# Patient Record
Sex: MALE
[De-identification: names, ages, dates, MRNs, and addresses within clinical notes are randomized per-mention and may not be internally consistent; named-entity substitution may affect disease eponyms.]

---
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
## 2021-01-04 ENCOUNTER — TRANSCRIPTION ENCOUNTER (OUTPATIENT)
Age: 21
End: 2021-01-04

## 2021-02-23 ENCOUNTER — APPOINTMENT (OUTPATIENT)
Dept: ENDOCRINOLOGY | Facility: CLINIC | Age: 21
End: 2021-02-23
Payer: COMMERCIAL

## 2021-02-23 PROCEDURE — 99214 OFFICE O/P EST MOD 30 MIN: CPT

## 2021-02-23 PROCEDURE — 99072 ADDL SUPL MATRL&STAF TM PHE: CPT

## 2021-02-23 NOTE — REASON FOR VISIT
[Home] : at home, [unfilled] , at the time of the visit. [Medical Office: ([facility])___] : at the medical office located in  [Verbal consent obtained from patient] : the patient, [unfilled] [Follow - Up] : a follow-up visit [Pituitary Evaluation/ Disorder] : pituitary evaluation/disorder

## 2021-02-23 NOTE — HISTORY OF PRESENT ILLNESS
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 They are on estrogen tx as they are considering being transgender however they are stating that they are still trying to "figure this  out". They are under care of a psychotherapist and in a supportive family environment.\par Gerber is known to have a pituitary microadenoma (3 mm on the MRI of 12/12/2019).\par Endocrine w/u has been negative for a functioning pituitary adenoma so far except for PRL level of 30.3 ng/ml on 8/21/2020(on estrogen tx).\par In addition Gerber reports a previous OGTT during which GH level reportedly johnny paradoxically - Gerber will forward the results to me.\rey Mayes is on tx with injectable and oral estrogen and would like to start also Estragel.\par The latest E2 level was 85.\rey Mayes is also on micronized progesterone and a 5-alpha-reductase inhibitor.\par Gerber c/o occasional headaches and nausea.\par 11/17/2020. This was a telephone appointment; I obtained the patient's consent.\par Gerber is doing well but c/o excessive hair on the face and body.\par OGTT tests failed to reveal evidence for acromegaly.\par 2/23/21. The patient is doing well overall.\par The excessive hair growth (which was his major complaint) is improving with electrolysis.\par He would like to adjust his oral estradiol tx and switch from estradiol valerate to depo estradiol cipionate.

## 2021-02-23 NOTE — ASSESSMENT
[FreeTextEntry1] : The patient's condition is stable.\par Will adjust estradiol tx.\par Lab. tests in one month.\par F/u once the results of the lab. tests are available.

## 2021-02-24 RX ORDER — ESTRADIOL VALERATE 40 MG/ML
40 INJECTION INTRAMUSCULAR
Qty: 5 | Refills: 2 | Status: DISCONTINUED | COMMUNITY
Start: 2020-03-27 | End: 2021-02-24

## 2021-02-24 RX ORDER — ESTRADIOL 0.25 MG/.25G
0.25 GEL TOPICAL
Qty: 1 | Refills: 3 | Status: DISCONTINUED | COMMUNITY
Start: 2020-11-17 | End: 2021-02-24

## 2021-02-24 RX ORDER — ESTRADIOL 0.75 MG/1.25G
0.75 MG/1.25 GM GEL, METERED TOPICAL DAILY
Qty: 1 | Refills: 6 | Status: DISCONTINUED | COMMUNITY
Start: 2020-08-24 | End: 2021-02-24

## 2021-04-08 ENCOUNTER — TRANSCRIPTION ENCOUNTER (OUTPATIENT)
Age: 21
End: 2021-04-08

## 2021-04-08 LAB
ALBUMIN SERPL ELPH-MCNC: 4.5 G/DL
ALP BLD-CCNC: 56 U/L
ALT SERPL-CCNC: 13 U/L
ANION GAP SERPL CALC-SCNC: 12 MMOL/L
AST SERPL-CCNC: 13 U/L
B-OH-BUTYR SERPL-SCNC: 0.2 MMOL/L
BASOPHILS # BLD AUTO: 0.02 K/UL
BASOPHILS NFR BLD AUTO: 0.2 %
BILIRUB SERPL-MCNC: 0.5 MG/DL
BUN SERPL-MCNC: 14 MG/DL
CALCIUM SERPL-MCNC: 9.9 MG/DL
CHLORIDE SERPL-SCNC: 100 MMOL/L
CHOLEST SERPL-MCNC: 160 MG/DL
CO2 SERPL-SCNC: 25 MMOL/L
CORTIS SERPL-MCNC: 17 UG/DL
CREAT SERPL-MCNC: 0.78 MG/DL
CRP SERPL-MCNC: <3 MG/L
EOSINOPHIL # BLD AUTO: 0.12 K/UL
EOSINOPHIL NFR BLD AUTO: 1.4 %
ERYTHROCYTE [SEDIMENTATION RATE] IN BLOOD BY WESTERGREN METHOD: 2 MM/HR
ESTRADIOL SERPL-MCNC: 105 PG/ML
FERRITIN SERPL-MCNC: 84 NG/ML
FSH SERPL-MCNC: <0.3 IU/L
GLUCOSE SERPL-MCNC: 99 MG/DL
HCT VFR BLD CALC: 40.4 %
HDLC SERPL-MCNC: 52 MG/DL
HGB BLD-MCNC: 13.4 G/DL
IGF-1 INTERP: NORMAL
IGF-I BLD-MCNC: 270 NG/ML
IMM GRANULOCYTES NFR BLD AUTO: 0.2 %
IRON SATN MFR SERPL: 33 %
IRON SERPL-MCNC: 90 UG/DL
LDLC SERPL CALC-MCNC: 92 MG/DL
LH SERPL-ACNC: 3.1 IU/L
LYMPHOCYTES # BLD AUTO: 2.06 K/UL
LYMPHOCYTES NFR BLD AUTO: 23.9 %
MAN DIFF?: NORMAL
MCHC RBC-ENTMCNC: 29.8 PG
MCHC RBC-ENTMCNC: 33.2 GM/DL
MCV RBC AUTO: 89.8 FL
MONOCYTES # BLD AUTO: 0.63 K/UL
MONOCYTES NFR BLD AUTO: 7.3 %
NEUTROPHILS # BLD AUTO: 5.77 K/UL
NEUTROPHILS NFR BLD AUTO: 67 %
NONHDLC SERPL-MCNC: 108 MG/DL
PLATELET # BLD AUTO: 259 K/UL
POTASSIUM SERPL-SCNC: 3.7 MMOL/L
PROLACTIN SERPL-MCNC: 21.1 NG/ML
PROT SERPL-MCNC: 6.9 G/DL
RBC # BLD: 4.5 M/UL
RBC # FLD: 13.6 %
SHBG SERPL-SCNC: 40.3 NMOL/L
SODIUM SERPL-SCNC: 138 MMOL/L
TIBC SERPL-MCNC: 271 UG/DL
TRIGL SERPL-MCNC: 80 MG/DL
UIBC SERPL-MCNC: 181 UG/DL
WBC # FLD AUTO: 8.62 K/UL

## 2021-04-12 ENCOUNTER — TRANSCRIPTION ENCOUNTER (OUTPATIENT)
Age: 21
End: 2021-04-12

## 2021-04-12 LAB
ANDROSTANOLONE SERPL-MCNC: 12 NG/DL
TESTOST BND SERPL-MCNC: 14.4 PG/ML
TESTOST SERPL-MCNC: 142.4 NG/DL

## 2021-04-19 ENCOUNTER — APPOINTMENT (OUTPATIENT)
Dept: ENDOCRINOLOGY | Facility: CLINIC | Age: 21
End: 2021-04-19
Payer: COMMERCIAL

## 2021-04-19 PROCEDURE — 99441: CPT

## 2021-04-19 NOTE — ASSESSMENT
[FreeTextEntry1] : TEB visit did not work; \par converted to a telephone appointment.\par Lab results discussed - pt needs an in-person visit with Dr. Lee to be evaluated before any adjustment of medications are made.\par 5 min

## 2021-04-19 NOTE — HISTORY OF PRESENT ILLNESS
[FreeTextEntry1] : Pt of Dr. Lee, called to discuss recent lab results.\par Would like to bring his testosterone to below 60.\par Would like to increase the dose of  estradiol to 15 mg/week.\par C/o "breast feels too flat".\par Otherwise feels fine.

## 2021-04-19 NOTE — REASON FOR VISIT
[Home] : at home, [unfilled] , at the time of the visit. [Medical Office: ([facility])___] : at the medical office located in  [Verbal consent obtained from patient] : the patient, [unfilled] [Transgender Care] : transgender care

## 2021-04-21 ENCOUNTER — APPOINTMENT (OUTPATIENT)
Dept: ENDOCRINOLOGY | Facility: CLINIC | Age: 21
End: 2021-04-21
Payer: COMMERCIAL

## 2021-04-21 VITALS
HEART RATE: 85 BPM | BODY MASS INDEX: 18.73 KG/M2 | DIASTOLIC BLOOD PRESSURE: 73 MMHG | WEIGHT: 130.8 LBS | SYSTOLIC BLOOD PRESSURE: 109 MMHG | HEIGHT: 70 IN

## 2021-04-21 PROCEDURE — 99072 ADDL SUPL MATRL&STAF TM PHE: CPT

## 2021-04-21 PROCEDURE — 99214 OFFICE O/P EST MOD 30 MIN: CPT

## 2021-04-21 NOTE — HISTORY OF PRESENT ILLNESS
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 They are on estrogen tx as they are considering being transgender however they are stating that they are still trying to "figure this  out". They are under care of a psychotherapist and in a supportive family environment.\par Gerber is known to have a pituitary microadenoma (3 mm on the MRI of 12/12/2019).\par Endocrine w/u has been negative for a functioning pituitary adenoma so far except for PRL level of 30.3 ng/ml on 8/21/2020(on estrogen tx).\par In addition Gerber reports a previous OGTT during which GH level reportedly johnny paradoxically - Gerber will forward the results to me.\par Gerber is on tx with injectable and oral estrogen and would like to start also Estragel.\par The latest E2 level was 85.\par Gerber is also on micronized progesterone and a 5-alpha-reductase inhibitor.\par Gerber c/o occasional headaches and nausea.\par 11/17/2020. This was a telephone appointment; I obtained the patient's consent.\par Gerber is doing well but c/o excessive hair on the face and body.\par OGTT tests failed to reveal evidence for acromegaly.\par 2/23/21. The patient is doing well overall.\par The excessive hair growth (which was his major complaint) is improving with electrolysis.\par He would like to adjust his oral estradiol tx and switch from estradiol valerate to depo estradiol cipionate.\par 4/21/21. Gerber is doing well self-injecting estradiol cipionate.\par E2 level was 105 on 4/5/21.\par PRL level was 21. \par Last pituitary MRI was on 12/12/19 - c/w pituitary microadenoma.\par Gerber is interested in doing  semen analysis.

## 2021-04-21 NOTE — REASON FOR VISIT
[Follow - Up] : a follow-up visit [Pituitary Evaluation/ Disorder] : pituitary evaluation/disorder [Transgender Care] : transgender care

## 2021-04-21 NOTE — ASSESSMENT
[FreeTextEntry1] : Male-to-female transgender individual.\par Pituitary microadenoma.\par \par Will increase the dose of estrogen.\par Will repeat pituitary MRI.\par Referral to reproductive urologist.\par F/u - 2-3 months.

## 2021-05-12 ENCOUNTER — TRANSCRIPTION ENCOUNTER (OUTPATIENT)
Age: 21
End: 2021-05-12

## 2021-05-14 ENCOUNTER — APPOINTMENT (OUTPATIENT)
Dept: UROLOGY | Facility: CLINIC | Age: 21
End: 2021-05-14

## 2021-05-28 ENCOUNTER — TRANSCRIPTION ENCOUNTER (OUTPATIENT)
Age: 21
End: 2021-05-28

## 2021-06-08 ENCOUNTER — TRANSCRIPTION ENCOUNTER (OUTPATIENT)
Age: 21
End: 2021-06-08

## 2021-06-08 LAB
ALBUMIN SERPL ELPH-MCNC: 4.6 G/DL
ALP BLD-CCNC: 66 U/L
ALT SERPL-CCNC: 9 U/L
ANION GAP SERPL CALC-SCNC: 13 MMOL/L
AST SERPL-CCNC: 12 U/L
BASOPHILS # BLD AUTO: 0.04 K/UL
BASOPHILS NFR BLD AUTO: 0.4 %
BILIRUB SERPL-MCNC: 0.4 MG/DL
BUN SERPL-MCNC: 18 MG/DL
CALCIUM SERPL-MCNC: 9.6 MG/DL
CHLORIDE SERPL-SCNC: 104 MMOL/L
CHOLEST SERPL-MCNC: 156 MG/DL
CO2 SERPL-SCNC: 22 MMOL/L
CREAT SERPL-MCNC: 0.66 MG/DL
EOSINOPHIL # BLD AUTO: 0.1 K/UL
EOSINOPHIL NFR BLD AUTO: 1.1 %
ESTIMATED AVERAGE GLUCOSE: 100 MG/DL
ESTRADIOL SERPL-MCNC: 175 PG/ML
ESTRONE SERPL-MCNC: 81 PG/ML
FSH SERPL-MCNC: <0.3 IU/L
GLUCOSE SERPL-MCNC: 94 MG/DL
HBA1C MFR BLD HPLC: 5.1 %
HCT VFR BLD CALC: 40.2 %
HDLC SERPL-MCNC: 51 MG/DL
HGB BLD-MCNC: 13.9 G/DL
IMM GRANULOCYTES NFR BLD AUTO: 0.3 %
LDLC SERPL CALC-MCNC: 93 MG/DL
LH SERPL-ACNC: 0.4 IU/L
LYMPHOCYTES # BLD AUTO: 1.85 K/UL
LYMPHOCYTES NFR BLD AUTO: 20.5 %
MAN DIFF?: NORMAL
MCHC RBC-ENTMCNC: 30.9 PG
MCHC RBC-ENTMCNC: 34.6 GM/DL
MCV RBC AUTO: 89.3 FL
MONOCYTES # BLD AUTO: 0.45 K/UL
MONOCYTES NFR BLD AUTO: 5 %
NEUTROPHILS # BLD AUTO: 6.57 K/UL
NEUTROPHILS NFR BLD AUTO: 72.7 %
NONHDLC SERPL-MCNC: 106 MG/DL
PLATELET # BLD AUTO: 295 K/UL
POTASSIUM SERPL-SCNC: 4.3 MMOL/L
PROLACTIN SERPL-MCNC: 21.3 NG/ML
PROT SERPL-MCNC: 7 G/DL
RBC # BLD: 4.5 M/UL
RBC # FLD: 12.3 %
SHBG SERPL-SCNC: 51.5 NMOL/L
SODIUM SERPL-SCNC: 139 MMOL/L
T3 SERPL-MCNC: 93 NG/DL
T4 FREE SERPL-MCNC: 1.1 NG/DL
TESTOST BND SERPL-MCNC: 13.1 PG/ML
TESTOST SERPL-MCNC: 35.1 NG/DL
TRIGL SERPL-MCNC: 64 MG/DL
TSH SERPL-ACNC: 1.53 UIU/ML
WBC # FLD AUTO: 9.04 K/UL

## 2021-06-10 ENCOUNTER — TRANSCRIPTION ENCOUNTER (OUTPATIENT)
Age: 21
End: 2021-06-10

## 2021-06-17 ENCOUNTER — TRANSCRIPTION ENCOUNTER (OUTPATIENT)
Age: 21
End: 2021-06-17

## 2021-06-17 LAB — ESTROGEN SERPL-MCNC: 303 PG/ML

## 2021-09-24 ENCOUNTER — TRANSCRIPTION ENCOUNTER (OUTPATIENT)
Age: 21
End: 2021-09-24

## 2021-12-08 ENCOUNTER — NON-APPOINTMENT (OUTPATIENT)
Age: 21
End: 2021-12-08

## 2023-05-31 ENCOUNTER — APPOINTMENT (OUTPATIENT)
Dept: ENDOCRINOLOGY | Facility: CLINIC | Age: 23
End: 2023-05-31
Payer: MEDICAID

## 2023-05-31 VITALS — SYSTOLIC BLOOD PRESSURE: 105 MMHG | DIASTOLIC BLOOD PRESSURE: 62 MMHG | WEIGHT: 139 LBS | HEART RATE: 66 BPM

## 2023-05-31 DIAGNOSIS — D35.2 BENIGN NEOPLASM OF PITUITARY GLAND: ICD-10-CM

## 2023-05-31 PROCEDURE — 99214 OFFICE O/P EST MOD 30 MIN: CPT

## 2023-05-31 NOTE — ASSESSMENT
[FreeTextEntry1] : Gender dysphoria.\par Pituitary adenoma.\par \par Lab. tests ordered.\par Repeat pituitary MRI.\par Will obtain a letter from the patient's therapist re the need for estrogen tx.\par Bone density ordered.\par Will begin estradiol cipionate injections.\par F/U once the above results are available.\par

## 2023-05-31 NOTE — HISTORY OF PRESENT ILLNESS
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 They are on estrogen tx as they are considering being transgender however they are stating that they are still trying to "figure this  out". They are under care of a psychotherapist and in a supportive family environment.\par Gerber is known to have a pituitary microadenoma (3 mm on the MRI of 12/12/2019).\par Endocrine w/u has been negative for a functioning pituitary adenoma so far except for PRL level of 30.3 ng/ml on 8/21/2020(on estrogen tx).\par In addition Gerber reports a previous OGTT during which GH level reportedly johnny paradoxically - Gerber will forward the results to me.\par Gerber is on tx with injectable and oral estrogen and would like to start also Estragel.\par The latest E2 level was 85.\par Greber is also on micronized progesterone and a 5-alpha-reductase inhibitor.\par Gerber c/o occasional headaches and nausea.\par 11/17/2020. This was a telephone appointment; I obtained the patient's consent.\par Gerber is doing well but c/o excessive hair on the face and body.\par OGTT tests failed to reveal evidence for acromegaly.\par 2/23/21. The patient is doing well overall.\par The excessive hair growth (which was his major complaint) is improving with electrolysis.\par He would like to adjust his oral estradiol tx and switch from estradiol valerate to depo estradiol cipionate.\par 4/21/21. Gerber is doing well self-injecting estradiol cipionate.\par E2 level was 105 on 4/5/21.\par PRL level was 21. \par Last pituitary MRI was on 12/12/19 - c/w pituitary microadenoma.\par Gerber is interested in doing  semen analysis.\par 5/31/23. Current pronouns are he/him and they.\par The patient has stopped hormone therapy about 1 yr ago but states that he felt much better on estradiol tx and would like to restart this tx.\par He states that he is under the care of a therapist who agrees that estrogen tx should be restarted.\par

## 2023-06-05 ENCOUNTER — NON-APPOINTMENT (OUTPATIENT)
Age: 23
End: 2023-06-05

## 2023-06-05 ENCOUNTER — TRANSCRIPTION ENCOUNTER (OUTPATIENT)
Age: 23
End: 2023-06-05

## 2023-06-05 LAB
ACTH SER-ACNC: 20.6 PG/ML
ALBUMIN SERPL ELPH-MCNC: 4.7 G/DL
ALP BLD-CCNC: 50 U/L
ALT SERPL-CCNC: 24 U/L
ANION GAP SERPL CALC-SCNC: 13 MMOL/L
AST SERPL-CCNC: 17 U/L
BILIRUB SERPL-MCNC: 0.2 MG/DL
BUN SERPL-MCNC: 14 MG/DL
CALCIUM SERPL-MCNC: 10.1 MG/DL
CHLORIDE SERPL-SCNC: 103 MMOL/L
CHOLEST SERPL-MCNC: 180 MG/DL
CO2 SERPL-SCNC: 25 MMOL/L
CREAT SERPL-MCNC: 0.97 MG/DL
EGFR: 114 ML/MIN/1.73M2
ESTIMATED AVERAGE GLUCOSE: 108 MG/DL
ESTRADIOL SERPL-MCNC: 31 PG/ML
FERRITIN SERPL-MCNC: 57 NG/ML
FSH SERPL-MCNC: 1 IU/L
GH SERPL-MCNC: 1.14 NG/ML
GLUCOSE SERPL-MCNC: 94 MG/DL
HBA1C MFR BLD HPLC: 5.4 %
HDLC SERPL-MCNC: 40 MG/DL
IGF-1 INTERP: NORMAL
IGF-I BLD-MCNC: 283 NG/ML
LDLC SERPL CALC-MCNC: 126 MG/DL
LH SERPL-ACNC: 6.3 IU/L
NONHDLC SERPL-MCNC: 140 MG/DL
POTASSIUM SERPL-SCNC: 4.8 MMOL/L
PROLACTIN SERPL-MCNC: 15 NG/ML
PROT SERPL-MCNC: 7.1 G/DL
SHBG SERPL-SCNC: 25 NMOL/L
SODIUM SERPL-SCNC: 142 MMOL/L
T3 SERPL-MCNC: 114 NG/DL
T4 FREE SERPL-MCNC: 1.2 NG/DL
TESTOST FREE SERPL-MCNC: 2.8 PG/ML
TESTOST SERPL-MCNC: 702 NG/DL
TRIGL SERPL-MCNC: 70 MG/DL
TSH SERPL-ACNC: 3.91 UIU/ML

## 2023-06-06 LAB — ESTRONE SERPL-MCNC: 129 PG/ML

## 2023-06-08 ENCOUNTER — APPOINTMENT (OUTPATIENT)
Dept: INTERNAL MEDICINE | Facility: CLINIC | Age: 23
End: 2023-06-08
Payer: MEDICAID

## 2023-06-08 ENCOUNTER — NON-APPOINTMENT (OUTPATIENT)
Age: 23
End: 2023-06-08

## 2023-06-08 ENCOUNTER — APPOINTMENT (OUTPATIENT)
Dept: INTERNAL MEDICINE | Facility: CLINIC | Age: 23
End: 2023-06-08

## 2023-06-08 VITALS
HEART RATE: 72 BPM | SYSTOLIC BLOOD PRESSURE: 93 MMHG | RESPIRATION RATE: 16 BRPM | OXYGEN SATURATION: 99 % | BODY MASS INDEX: 19.9 KG/M2 | DIASTOLIC BLOOD PRESSURE: 57 MMHG | HEIGHT: 70 IN | WEIGHT: 139 LBS | TEMPERATURE: 98.3 F

## 2023-06-08 DIAGNOSIS — Z00.00 ENCOUNTER FOR GENERAL ADULT MEDICAL EXAMINATION W/OUT ABNORMAL FINDINGS: ICD-10-CM

## 2023-06-08 DIAGNOSIS — Z87.448 PERSONAL HISTORY OF OTHER DISEASES OF URINARY SYSTEM: ICD-10-CM

## 2023-06-08 DIAGNOSIS — E61.1 IRON DEFICIENCY: ICD-10-CM

## 2023-06-08 DIAGNOSIS — L70.0 ACNE VULGARIS: ICD-10-CM

## 2023-06-08 PROCEDURE — 99385 PREV VISIT NEW AGE 18-39: CPT | Mod: 25

## 2023-06-08 PROCEDURE — 99203 OFFICE O/P NEW LOW 30 MIN: CPT | Mod: 25

## 2023-06-08 PROCEDURE — 36415 COLL VENOUS BLD VENIPUNCTURE: CPT

## 2023-06-08 RX ORDER — TRETINOIN 0.1 MG/G
0.01 GEL TOPICAL
Qty: 45 | Refills: 0 | Status: ACTIVE | COMMUNITY
Start: 2020-09-03

## 2023-06-08 RX ORDER — FERROUS FUMARATE 324(106)MG
324 (106 FE) TABLET ORAL
Qty: 30 | Refills: 0 | Status: COMPLETED | COMMUNITY
Start: 2021-03-12 | End: 2023-06-08

## 2023-06-08 RX ORDER — KETOCONAZOLE 20.5 MG/ML
2 SHAMPOO, SUSPENSION TOPICAL
Qty: 120 | Refills: 0 | Status: COMPLETED | COMMUNITY
Start: 2020-08-09 | End: 2023-06-08

## 2023-06-08 RX ORDER — AMOXICILLIN 875 MG/1
875 TABLET, FILM COATED ORAL
Qty: 20 | Refills: 0 | Status: COMPLETED | COMMUNITY
Start: 2020-11-18 | End: 2023-06-08

## 2023-06-08 RX ORDER — FLUTICASONE PROPIONATE 250 UG/1
250 POWDER, METERED RESPIRATORY (INHALATION)
Qty: 60 | Refills: 0 | Status: COMPLETED | COMMUNITY
Start: 2020-08-05 | End: 2023-06-08

## 2023-06-08 RX ORDER — TRETINOIN 1 MG/G
0.1 CREAM TOPICAL
Qty: 45 | Refills: 0 | Status: ACTIVE | COMMUNITY
Start: 2020-01-05

## 2023-06-08 RX ORDER — TESTOSTERONE 50 MG/5G
50 MG/5GM GEL TRANSDERMAL
Qty: 150 | Refills: 0 | Status: COMPLETED | COMMUNITY
Start: 2020-08-19 | End: 2023-06-08

## 2023-06-08 RX ORDER — TIOTROPIUM BROMIDE INHALATION SPRAY 3.12 UG/1
2.5 SPRAY, METERED RESPIRATORY (INHALATION)
Qty: 4 | Refills: 0 | Status: COMPLETED | COMMUNITY
Start: 2020-10-18 | End: 2023-06-08

## 2023-06-08 RX ORDER — SYRING-NEEDL,DISP,INSUL,0.3 ML 31 GX5/16"
30G X 1/2" SYRINGE, EMPTY DISPOSABLE MISCELLANEOUS
Qty: 100 | Refills: 0 | Status: COMPLETED | COMMUNITY
Start: 2021-02-24 | End: 2023-06-08

## 2023-06-08 RX ORDER — CHOLECALCIFEROL (VITAMIN D3) 50 MCG
50 MCG TABLET ORAL
Qty: 150 | Refills: 0 | Status: COMPLETED | COMMUNITY
Start: 2020-10-25 | End: 2023-06-08

## 2023-06-08 RX ORDER — AZELAIC ACID 0.15 G/G
15 GEL TOPICAL
Qty: 50 | Refills: 0 | Status: ACTIVE | COMMUNITY
Start: 2020-06-08

## 2023-06-08 RX ORDER — ISOPROPYL ALCOHOL 70 ML/100ML
70 SWAB TOPICAL
Qty: 100 | Refills: 0 | Status: COMPLETED | COMMUNITY
Start: 2020-10-19 | End: 2023-06-08

## 2023-06-08 RX ORDER — LIDOCAINE AND PRILOCAINE 25; 25 MG/G; MG/G
2.5-2.5 CREAM TOPICAL
Qty: 30 | Refills: 0 | Status: COMPLETED | COMMUNITY
Start: 2021-01-20 | End: 2023-06-08

## 2023-06-08 RX ORDER — ALBUTEROL SULFATE 90 UG/1
108 (90 BASE) INHALANT RESPIRATORY (INHALATION)
Qty: 18 | Refills: 0 | Status: COMPLETED | COMMUNITY
Start: 2020-10-18 | End: 2023-06-08

## 2023-06-08 RX ORDER — METHOCARBAMOL 750 MG/1
750 TABLET, FILM COATED ORAL
Qty: 30 | Refills: 0 | Status: COMPLETED | COMMUNITY
Start: 2020-10-18 | End: 2023-06-08

## 2023-06-08 RX ORDER — TAZAROTENE 1 MG/G
0.1 CREAM TOPICAL
Qty: 30 | Refills: 0 | Status: COMPLETED | COMMUNITY
Start: 2020-10-12 | End: 2023-06-08

## 2023-06-08 RX ORDER — GAUZE BANDAGE 4" X 4"
1000 BANDAGE TOPICAL
Qty: 120 | Refills: 1 | Status: COMPLETED | COMMUNITY
Start: 2021-04-21 | End: 2023-06-08

## 2023-06-08 RX ORDER — POLYETHYLENE GLYCOL 3350 AND ELECTROLYTES WITH LEMON FLAVOR 236; 22.74; 6.74; 5.86; 2.97 G/4L; G/4L; G/4L; G/4L; G/4L
236 POWDER, FOR SOLUTION ORAL
Qty: 4000 | Refills: 0 | Status: COMPLETED | COMMUNITY
Start: 2020-08-19 | End: 2023-06-08

## 2023-06-08 RX ORDER — PAROXETINE HYDROCHLORIDE 10 MG/1
10 TABLET, FILM COATED ORAL
Qty: 60 | Refills: 0 | Status: COMPLETED | COMMUNITY
Start: 2021-01-20 | End: 2023-06-08

## 2023-06-08 NOTE — PHYSICAL EXAM
[No Acute Distress] : no acute distress [Normal Sclera/Conjunctiva] : normal sclera/conjunctiva [EOMI] : extraocular movements intact [No Respiratory Distress] : no respiratory distress  [No Accessory Muscle Use] : no accessory muscle use [Normal Rate] : normal rate  [Regular Rhythm] : with a regular rhythm [Normal S1, S2] : normal S1 and S2 [No Edema] : there was no peripheral edema [Normal Mood] : the mood was normal [de-identified] : post inflammatory hyperpigmentation over back, acne over back. [de-identified] : pressured speech

## 2023-06-08 NOTE — HISTORY OF PRESENT ILLNESS
[FreeTextEntry1] : annual exam/est care [de-identified] : Gender dysphoria\par - follows w/ endo\par - was previously on GAHT, took a break and is in the process of resuming\par - does not like appearance of facial hair\par \par STI Screening\par - declined HIV screening; concerned about false positive

## 2023-06-09 VITALS
TEMPERATURE: 98.3 F | OXYGEN SATURATION: 99 % | DIASTOLIC BLOOD PRESSURE: 57 MMHG | BODY MASS INDEX: 19.9 KG/M2 | SYSTOLIC BLOOD PRESSURE: 93 MMHG | HEIGHT: 70 IN | WEIGHT: 139 LBS | HEART RATE: 72 BPM | RESPIRATION RATE: 16 BRPM

## 2023-06-13 ENCOUNTER — TRANSCRIPTION ENCOUNTER (OUTPATIENT)
Age: 23
End: 2023-06-13

## 2023-06-13 LAB
A1AT SERPL-MCNC: 156 MG/DL
APO LP(A) SERPL-MCNC: 14 NMOL/L
APPEARANCE: CLEAR
BACTERIA UR CULT: NORMAL
BACTERIA: NEGATIVE /HPF
BILIRUBIN URINE: NEGATIVE
BLOOD URINE: NEGATIVE
C TRACH RRNA SPEC QL NAA+PROBE: NOT DETECTED
CAST: 0 /LPF
COLOR: YELLOW
EPITHELIAL CELLS: 3 /HPF
GLUCOSE QUALITATIVE U: NEGATIVE MG/DL
HDL-C: 39 MG/DL
HDL-P (TOTAL): 20.3 UMOL/L
IRON SATN MFR SERPL: 29 %
IRON SERPL-MCNC: 97 UG/DL
KETONES URINE: ABNORMAL MG/DL
LDL SIZE: 21.5 NM
LDL-C: 117 MG/DL
LDL-P: 1091 NMOL/L
LEUKOCYTE ESTERASE URINE: NEGATIVE
LP-IR SCORE: 32
MICROSCOPIC-UA: NORMAL
N GONORRHOEA RRNA SPEC QL NAA+PROBE: NOT DETECTED
NITRITE URINE: NEGATIVE
NMR CHOLESTEROL, TOTAL: 172 MG/DL
PH URINE: 6.5
PROTEIN URINE: NORMAL MG/DL
RED BLOOD CELLS URINE: 1 /HPF
SMALL LDL-P: 297 NMOL/L
SOURCE AMPLIFICATION: NORMAL
SOURCE ANAL: NORMAL
SOURCE ORAL: NORMAL
SPECIFIC GRAVITY URINE: 1.03
T PALLIDUM AB SER QL IA: NEGATIVE
TIBC SERPL-MCNC: 338 UG/DL
TRIGL SERPL-MCNC: 86 MG/DL
UIBC SERPL-MCNC: 241 UG/DL
UROBILINOGEN URINE: 0.2 MG/DL
WHITE BLOOD CELLS URINE: 4 /HPF

## 2023-08-02 ENCOUNTER — APPOINTMENT (OUTPATIENT)
Dept: ENDOCRINOLOGY | Facility: CLINIC | Age: 23
End: 2023-08-02

## 2023-08-24 ENCOUNTER — APPOINTMENT (OUTPATIENT)
Dept: DERMATOLOGY | Facility: CLINIC | Age: 23
End: 2023-08-24
Payer: MEDICAID

## 2023-08-24 DIAGNOSIS — L28.0 LICHEN SIMPLEX CHRONICUS: ICD-10-CM

## 2023-08-24 DIAGNOSIS — D48.9 NEOPLASM OF UNCERTAIN BEHAVIOR, UNSPECIFIED: ICD-10-CM

## 2023-08-24 DIAGNOSIS — Z12.83 ENCOUNTER FOR SCREENING FOR MALIGNANT NEOPLASM OF SKIN: ICD-10-CM

## 2023-08-24 DIAGNOSIS — D22.9 MELANOCYTIC NEVI, UNSPECIFIED: ICD-10-CM

## 2023-08-24 DIAGNOSIS — L68.9 HYPERTRICHOSIS, UNSPECIFIED: ICD-10-CM

## 2023-08-24 PROCEDURE — 99204 OFFICE O/P NEW MOD 45 MIN: CPT

## 2023-08-24 NOTE — PHYSICAL EXAM
[Alert] : alert [Oriented x 3] : ~L oriented x 3 [Well Nourished] : well nourished [Conjunctiva Non-injected] : conjunctiva non-injected [No Visual Lymphadenopathy] : no visual  lymphadenopathy [No Clubbing] : no clubbing [No Edema] : no edema [No Bromhidrosis] : no bromhidrosis [No Chromhidrosis] : no chromhidrosis [Full Body Skin Exam Performed] : performed [FreeTextEntry3] : white skin scattered even symmetric brown macules and papules inflam paps and comedones on back facial hair  scaly papule without thrombosed vessels dorsal toe L upper arm brown papule with off center darker reticular pigment  L finger with very faint brown even band, no cuticle involvement 1 mm

## 2023-08-24 NOTE — HISTORY OF PRESENT ILLNESS
[FreeTextEntry1] : acne, moles [de-identified] : NPA pronoun - they moles on right palm x 2 dark spot nail, watched video about melanoma in nail so concerned, asking about mohs and chemotherapy tazarotene on back and face wants refills, denies irritation picked wart off toe

## 2023-09-01 ENCOUNTER — NON-APPOINTMENT (OUTPATIENT)
Age: 23
End: 2023-09-01

## 2023-09-01 ENCOUNTER — TRANSCRIPTION ENCOUNTER (OUTPATIENT)
Age: 23
End: 2023-09-01

## 2023-09-02 ENCOUNTER — NON-APPOINTMENT (OUTPATIENT)
Age: 23
End: 2023-09-02

## 2023-09-12 ENCOUNTER — NON-APPOINTMENT (OUTPATIENT)
Age: 23
End: 2023-09-12

## 2023-09-13 ENCOUNTER — TRANSCRIPTION ENCOUNTER (OUTPATIENT)
Age: 23
End: 2023-09-13

## 2023-09-14 ENCOUNTER — TRANSCRIPTION ENCOUNTER (OUTPATIENT)
Age: 23
End: 2023-09-14

## 2023-09-20 ENCOUNTER — APPOINTMENT (OUTPATIENT)
Dept: ENDOCRINOLOGY | Facility: CLINIC | Age: 23
End: 2023-09-20
Payer: MEDICAID

## 2023-09-20 VITALS — DIASTOLIC BLOOD PRESSURE: 62 MMHG | SYSTOLIC BLOOD PRESSURE: 99 MMHG | HEART RATE: 67 BPM | WEIGHT: 130 LBS

## 2023-09-20 DIAGNOSIS — Z13.820 ENCOUNTER FOR SCREENING FOR OSTEOPOROSIS: ICD-10-CM

## 2023-09-20 DIAGNOSIS — Z13.21 ENCOUNTER FOR SCREENING FOR NUTRITIONAL DISORDER: ICD-10-CM

## 2023-09-20 PROCEDURE — 99214 OFFICE O/P EST MOD 30 MIN: CPT

## 2023-09-22 ENCOUNTER — TRANSCRIPTION ENCOUNTER (OUTPATIENT)
Age: 23
End: 2023-09-22

## 2023-09-26 ENCOUNTER — OUTPATIENT (OUTPATIENT)
Dept: OUTPATIENT SERVICES | Facility: HOSPITAL | Age: 23
LOS: 1 days | End: 2023-09-26

## 2023-09-26 ENCOUNTER — APPOINTMENT (OUTPATIENT)
Dept: RADIOLOGY | Facility: CLINIC | Age: 23
End: 2023-09-26
Payer: MEDICAID

## 2023-09-26 PROCEDURE — 77080 DXA BONE DENSITY AXIAL: CPT | Mod: 26

## 2023-09-27 ENCOUNTER — TRANSCRIPTION ENCOUNTER (OUTPATIENT)
Age: 23
End: 2023-09-27

## 2023-09-27 LAB
25(OH)D3 SERPL-MCNC: 60.9 NG/ML
CHOLEST SERPL-MCNC: 156 MG/DL
ESTRADIOL SERPL-MCNC: 317 PG/ML
FOLATE RBC-MCNC: 819 NG/ML
FSH SERPL-MCNC: <0.3 IU/L
HCT VFR BLD CALC: 41.9 %
HDLC SERPL-MCNC: 52 MG/DL
IGF-1 INTERP: NORMAL
IGF-I BLD-MCNC: 241 NG/ML
LDLC SERPL CALC-MCNC: 91 MG/DL
LH SERPL-ACNC: <0.3 IU/L
NONHDLC SERPL-MCNC: 105 MG/DL
PROGEST SERPL-MCNC: 0.2 NG/ML
PROLACTIN SERPL-MCNC: 14.5 NG/ML
T3 SERPL-MCNC: 99 NG/DL
T4 FREE SERPL-MCNC: 1.2 NG/DL
TRIGL SERPL-MCNC: 71 MG/DL
TSH SERPL-ACNC: 2.49 UIU/ML
VIT B12 SERPL-MCNC: 674 PG/ML

## 2023-09-28 LAB
TESTOST FREE SERPL-MCNC: 9.9 PG/ML
TESTOST SERPL-MCNC: 46.7 NG/DL

## 2023-09-28 RX ORDER — AZELAIC ACID 0.15 G/G
15 AEROSOL, FOAM TOPICAL
Qty: 1 | Refills: 11 | Status: ACTIVE | COMMUNITY
Start: 2023-09-14 | End: 1900-01-01

## 2023-10-02 ENCOUNTER — APPOINTMENT (OUTPATIENT)
Dept: INTERNAL MEDICINE | Facility: CLINIC | Age: 23
End: 2023-10-02

## 2023-10-03 ENCOUNTER — APPOINTMENT (OUTPATIENT)
Dept: INTERNAL MEDICINE | Facility: CLINIC | Age: 23
End: 2023-10-03
Payer: MEDICAID

## 2023-10-03 DIAGNOSIS — R20.2 PARESTHESIA OF SKIN: ICD-10-CM

## 2023-10-03 LAB — ESTRONE SERPL-MCNC: 269 PG/ML

## 2023-10-03 PROCEDURE — 99443: CPT

## 2023-10-12 PROBLEM — R20.2 PARESTHESIA: Status: ACTIVE | Noted: 2023-10-03

## 2023-10-13 ENCOUNTER — TRANSCRIPTION ENCOUNTER (OUTPATIENT)
Age: 23
End: 2023-10-13

## 2023-10-19 ENCOUNTER — APPOINTMENT (OUTPATIENT)
Dept: INTERNAL MEDICINE | Facility: CLINIC | Age: 23
End: 2023-10-19
Payer: MEDICAID

## 2023-10-19 VITALS
OXYGEN SATURATION: 98 % | HEIGHT: 70 IN | HEART RATE: 73 BPM | WEIGHT: 126 LBS | BODY MASS INDEX: 18.04 KG/M2 | SYSTOLIC BLOOD PRESSURE: 97 MMHG | RESPIRATION RATE: 16 BRPM | DIASTOLIC BLOOD PRESSURE: 67 MMHG

## 2023-10-19 DIAGNOSIS — E16.2 HYPOGLYCEMIA, UNSPECIFIED: ICD-10-CM

## 2023-10-19 DIAGNOSIS — R20.2 PARESTHESIA OF SKIN: ICD-10-CM

## 2023-10-19 LAB
ALBUMIN SERPL ELPH-MCNC: 4.5 G/DL
ALP BLD-CCNC: 55 U/L
ALT SERPL-CCNC: 9 U/L
ANION GAP SERPL CALC-SCNC: 11 MMOL/L
AST SERPL-CCNC: 12 U/L
BASOPHILS # BLD AUTO: 0.05 K/UL
BASOPHILS NFR BLD AUTO: 0.7 %
BILIRUB SERPL-MCNC: 0.4 MG/DL
BUN SERPL-MCNC: 9 MG/DL
C TRACH RRNA SPEC QL NAA+PROBE: NOT DETECTED
CALCIUM SERPL-MCNC: 9.7 MG/DL
CHLORIDE SERPL-SCNC: 103 MMOL/L
CO2 SERPL-SCNC: 26 MMOL/L
CREAT SERPL-MCNC: 0.7 MG/DL
EGFR: 134 ML/MIN/1.73M2
EOSINOPHIL # BLD AUTO: 0.12 K/UL
EOSINOPHIL NFR BLD AUTO: 1.7 %
FERRITIN SERPL-MCNC: 98 NG/ML
GLUCOSE SERPL-MCNC: 71 MG/DL
HCT VFR BLD CALC: 36.5 %
HCV AB SER QL: NONREACTIVE
HCV S/CO RATIO: 0.12 S/CO
HGB BLD-MCNC: 12.4 G/DL
HIV1+2 AB SPEC QL IA.RAPID: NONREACTIVE
IMM GRANULOCYTES NFR BLD AUTO: 0.1 %
IRON SATN MFR SERPL: 30 %
IRON SERPL-MCNC: 81 UG/DL
LYMPHOCYTES # BLD AUTO: 1.91 K/UL
LYMPHOCYTES NFR BLD AUTO: 26.3 %
MAN DIFF?: NORMAL
MCHC RBC-ENTMCNC: 29.6 PG
MCHC RBC-ENTMCNC: 34 GM/DL
MCV RBC AUTO: 87.1 FL
MONOCYTES # BLD AUTO: 0.39 K/UL
MONOCYTES NFR BLD AUTO: 5.4 %
N GONORRHOEA RRNA SPEC QL NAA+PROBE: NOT DETECTED
NEUTROPHILS # BLD AUTO: 4.77 K/UL
NEUTROPHILS NFR BLD AUTO: 65.8 %
PLATELET # BLD AUTO: 264 K/UL
POTASSIUM SERPL-SCNC: 4.2 MMOL/L
PROT SERPL-MCNC: 6.8 G/DL
RBC # BLD: 4.19 M/UL
RBC # FLD: 13.9 %
SODIUM SERPL-SCNC: 140 MMOL/L
SOURCE AMPLIFICATION: NORMAL
T PALLIDUM AB SER QL IA: NEGATIVE
TIBC SERPL-MCNC: 269 UG/DL
UIBC SERPL-MCNC: 188 UG/DL
VIT B6 SERPL-MCNC: 22.3 UG/L
WBC # FLD AUTO: 7.25 K/UL

## 2023-10-19 PROCEDURE — 99213 OFFICE O/P EST LOW 20 MIN: CPT | Mod: GC

## 2023-11-03 ENCOUNTER — TRANSCRIPTION ENCOUNTER (OUTPATIENT)
Age: 23
End: 2023-11-03

## 2023-11-10 RX ORDER — EMTRICITABINE AND TENOFOVIR DISOPROXIL FUMARATE 200; 300 MG/1; MG/1
200-300 TABLET, FILM COATED ORAL
Qty: 30 | Refills: 3 | Status: ACTIVE | COMMUNITY
Start: 2023-10-24 | End: 1900-01-01

## 2023-11-16 ENCOUNTER — APPOINTMENT (OUTPATIENT)
Dept: INTERNAL MEDICINE | Facility: CLINIC | Age: 23
End: 2023-11-16
Payer: MEDICAID

## 2023-11-16 VITALS
DIASTOLIC BLOOD PRESSURE: 66 MMHG | TEMPERATURE: 98.3 F | WEIGHT: 126 LBS | HEART RATE: 69 BPM | HEIGHT: 70 IN | OXYGEN SATURATION: 99 % | BODY MASS INDEX: 18.04 KG/M2 | SYSTOLIC BLOOD PRESSURE: 102 MMHG | RESPIRATION RATE: 16 BRPM

## 2023-11-16 DIAGNOSIS — J02.9 ACUTE PHARYNGITIS, UNSPECIFIED: ICD-10-CM

## 2023-11-16 DIAGNOSIS — Z87.09 PERSONAL HISTORY OF OTHER DISEASES OF THE RESPIRATORY SYSTEM: ICD-10-CM

## 2023-11-16 PROCEDURE — 99213 OFFICE O/P EST LOW 20 MIN: CPT

## 2023-11-20 ENCOUNTER — TRANSCRIPTION ENCOUNTER (OUTPATIENT)
Age: 23
End: 2023-11-20

## 2023-11-20 LAB
BACTERIA THROAT CULT: NORMAL
C TRACH RRNA SPEC QL NAA+PROBE: NOT DETECTED
N GONORRHOEA RRNA SPEC QL NAA+PROBE: NOT DETECTED
SOURCE ORAL: NORMAL

## 2023-12-20 ENCOUNTER — APPOINTMENT (OUTPATIENT)
Dept: ENDOCRINOLOGY | Facility: CLINIC | Age: 23
End: 2023-12-20
Payer: MEDICAID

## 2023-12-20 VITALS — WEIGHT: 124.13 LBS | HEART RATE: 98 BPM | DIASTOLIC BLOOD PRESSURE: 71 MMHG | SYSTOLIC BLOOD PRESSURE: 108 MMHG

## 2023-12-20 DIAGNOSIS — R68.89 OTHER GENERAL SYMPTOMS AND SIGNS: ICD-10-CM

## 2023-12-20 PROCEDURE — 36415 COLL VENOUS BLD VENIPUNCTURE: CPT

## 2023-12-20 PROCEDURE — 99214 OFFICE O/P EST MOD 30 MIN: CPT | Mod: 25

## 2023-12-20 NOTE — REVIEW OF SYSTEMS
[As Noted in HPI] : as noted in HPI [Recent Weight Loss (___ Lbs)] : recent weight loss: [unfilled] lbs [Negative] : Heme/Lymph [FreeTextEntry2] : Heat intolerance

## 2023-12-20 NOTE — HISTORY OF PRESENT ILLNESS
[FreeTextEntry1] : This was a telephone appointment. I obtained the patient's consent. 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 They are on estrogen tx as they are considering being transgender however they are stating that they are still trying to "figure this  out". They are under care of a psychotherapist and in a supportive family environment. Gerber is known to have a pituitary microadenoma (3 mm on the MRI of 12/12/2019). Endocrine w/u has been negative for a functioning pituitary adenoma so far except for PRL level of 30.3 ng/ml on 8/21/2020(on estrogen tx). In addition Gerber reports a previous OGTT during which GH level reportedly johnny paradoxically - Gerber will forward the results to me. Gerber is on tx with injectable and oral estrogen and would like to start also Estragel. The latest E2 level was 85. Gerber is also on micronized progesterone and a 5-alpha-reductase inhibitor. Gerber c/o occasional headaches and nausea. 11/17/2020. This was a telephone appointment; I obtained the patient's consent. Gerber is doing well but c/o excessive hair on the face and body. OGTT tests failed to reveal evidence for acromegaly. 2/23/21. The patient is doing well overall. The excessive hair growth (which was his major complaint) is improving with electrolysis. He would like to adjust his oral estradiol tx and switch from estradiol valerate to depo estradiol cipionate. 4/21/21. Gerber is doing well self-injecting estradiol cipionate. E2 level was 105 on 4/5/21. PRL level was 21.  Last pituitary MRI was on 12/12/19 - c/w pituitary microadenoma. Gerber is interested in doing  semen analysis. 5/31/23. Current pronouns are he/him and they. The patient has stopped hormone therapy about 1 yr ago but states that he felt much better on estradiol tx and would like to restart this tx. He states that he is under the care of a therapist who agrees that estrogen tx should be restarted. 9/20/23. Current pronouns are they/them or he/him. Gerber has been using Estrogel 4 pumps (3mg) daily since the injectable estrogen (his first choice) is on back order. Doing well - is not interested in anti-androgen tx at this time. 12/20/23. Gerber c/o heat intolerance, excessive sweating. He would like to change his hormone regimen to injectable estrogen and progesterone preparations. He has lost 6 lb. His home situation is supportive. He is in the last 2 months of school for associate degree.

## 2023-12-20 NOTE — ASSESSMENT
[FreeTextEntry1] : MTF non-binary person.  TFTs today. Discussed at length risks and limited knowledge about the benefits of progesterone tx and i/m injections. lab. tests ordered. Medications prescribed. Will limit i/m progesterone trial to 2 weeks initially. F/u - 2 weeks.

## 2023-12-21 RX ORDER — ISOPROPYL ALCOHOL 0.7 ML/ML
SWAB TOPICAL
Qty: 200 | Refills: 11 | Status: ACTIVE | COMMUNITY
Start: 2021-04-21 | End: 1900-01-01

## 2023-12-22 ENCOUNTER — TRANSCRIPTION ENCOUNTER (OUTPATIENT)
Age: 23
End: 2023-12-22

## 2023-12-22 LAB
ALBUMIN SERPL ELPH-MCNC: 5.2 G/DL
ALP BLD-CCNC: 62 U/L
ALT SERPL-CCNC: 11 U/L
ANION GAP SERPL CALC-SCNC: 12 MMOL/L
AST SERPL-CCNC: 13 U/L
BASOPHILS # BLD AUTO: 0.06 K/UL
BASOPHILS NFR BLD AUTO: 1 %
BILIRUB SERPL-MCNC: 0.3 MG/DL
BUN SERPL-MCNC: 13 MG/DL
CALCIUM SERPL-MCNC: 10 MG/DL
CHLORIDE SERPL-SCNC: 104 MMOL/L
CO2 SERPL-SCNC: 24 MMOL/L
CREAT SERPL-MCNC: 0.86 MG/DL
EGFR: 126 ML/MIN/1.73M2
EOSINOPHIL # BLD AUTO: 0.13 K/UL
EOSINOPHIL NFR BLD AUTO: 2.1 %
ESTRADIOL SERPL-MCNC: 31 PG/ML
FSH SERPL-MCNC: 0.9 IU/L
GLUCOSE SERPL-MCNC: 69 MG/DL
HCT VFR BLD CALC: 42 %
HGB BLD-MCNC: 14.1 G/DL
IMM GRANULOCYTES NFR BLD AUTO: 0.2 %
LH SERPL-ACNC: 6.5 IU/L
LYMPHOCYTES # BLD AUTO: 2.24 K/UL
LYMPHOCYTES NFR BLD AUTO: 36.2 %
MAN DIFF?: NORMAL
MCHC RBC-ENTMCNC: 29.3 PG
MCHC RBC-ENTMCNC: 33.6 GM/DL
MCV RBC AUTO: 87.3 FL
MONOCYTES # BLD AUTO: 0.44 K/UL
MONOCYTES NFR BLD AUTO: 7.1 %
NEUTROPHILS # BLD AUTO: 3.31 K/UL
NEUTROPHILS NFR BLD AUTO: 53.4 %
PLATELET # BLD AUTO: 335 K/UL
POTASSIUM SERPL-SCNC: 4.1 MMOL/L
PROGEST SERPL-MCNC: 0.4 NG/ML
PROLACTIN SERPL-MCNC: 6.5 NG/ML
PROT SERPL-MCNC: 7.7 G/DL
RBC # BLD: 4.81 M/UL
RBC # FLD: 12.9 %
SODIUM SERPL-SCNC: 141 MMOL/L
T3 SERPL-MCNC: 108 NG/DL
T4 FREE SERPL-MCNC: 1.4 NG/DL
TESTOST FREE SERPL-MCNC: 20.4 PG/ML
TESTOST SERPL-MCNC: 809 NG/DL
THYROGLOB AB SERPL-ACNC: <20 IU/ML
THYROPEROXIDASE AB SERPL IA-ACNC: <10 IU/ML
TSH SERPL-ACNC: 1.5 UIU/ML
WBC # FLD AUTO: 6.19 K/UL

## 2023-12-26 ENCOUNTER — APPOINTMENT (OUTPATIENT)
Dept: ENDOCRINOLOGY | Facility: CLINIC | Age: 23
End: 2023-12-26

## 2023-12-27 ENCOUNTER — NON-APPOINTMENT (OUTPATIENT)
Age: 23
End: 2023-12-27

## 2023-12-28 ENCOUNTER — TRANSCRIPTION ENCOUNTER (OUTPATIENT)
Age: 23
End: 2023-12-28

## 2023-12-28 LAB
PROGEST SERPL-MCNC: 15.2 NG/ML
T3 SERPL-MCNC: 108 NG/DL
T4 FREE SERPL-MCNC: 1.4 NG/DL
TSH SERPL-ACNC: 2.6 UIU/ML
TSI ACT/NOR SER: <0.1 IU/L

## 2024-01-03 ENCOUNTER — TRANSCRIPTION ENCOUNTER (OUTPATIENT)
Age: 24
End: 2024-01-03

## 2024-01-03 LAB
TESTOST FREE SERPL-MCNC: 10.1 PG/ML
TESTOST SERPL-MCNC: 655 NG/DL

## 2024-01-17 ENCOUNTER — APPOINTMENT (OUTPATIENT)
Dept: ENDOCRINOLOGY | Facility: CLINIC | Age: 24
End: 2024-01-17

## 2024-01-18 ENCOUNTER — APPOINTMENT (OUTPATIENT)
Dept: INTERNAL MEDICINE | Facility: CLINIC | Age: 24
End: 2024-01-18

## 2024-01-26 ENCOUNTER — APPOINTMENT (OUTPATIENT)
Dept: ENDOCRINOLOGY | Facility: CLINIC | Age: 24
End: 2024-01-26
Payer: MEDICAID

## 2024-01-26 VITALS — DIASTOLIC BLOOD PRESSURE: 69 MMHG | HEIGHT: 70 IN | SYSTOLIC BLOOD PRESSURE: 106 MMHG | HEART RATE: 81 BPM

## 2024-01-26 DIAGNOSIS — R73.09 OTHER ABNORMAL GLUCOSE: ICD-10-CM

## 2024-01-26 DIAGNOSIS — R10.9 UNSPECIFIED ABDOMINAL PAIN: ICD-10-CM

## 2024-01-26 LAB — GLUCOSE BLDC GLUCOMTR-MCNC: 85

## 2024-01-26 PROCEDURE — 82962 GLUCOSE BLOOD TEST: CPT

## 2024-01-26 PROCEDURE — 99214 OFFICE O/P EST MOD 30 MIN: CPT | Mod: 25

## 2024-01-26 NOTE — ASSESSMENT
[Self Monitoring of Blood Glucose] : self monitoring of blood glucose [FreeTextEntry1] : - hypoglycemia - detailed discussion about possible reasons. Decxom G7 placed during the visit, reader set up. Informed about possible false readings -any extreme has to be reconfirmed with FS. Importance of test strips not to be . Tx of hypoglycemia; recommendations on frequent meals with ~ 30gr of complex carbs q 3 h. Labs today. - stomach pain - will check lipase and amylase; referred to GI. Back pain - will f/u with Dr. Phoenix. Refills sent.

## 2024-01-26 NOTE — HISTORY OF PRESENT ILLNESS
[FreeTextEntry1] : Pt of Dr. Lee, called to discuss recent lab results. Would like to bring his testosterone to below 60. Would like to increase the dose of  estradiol to 15 mg/week. C/o "breast feels too flat". Otherwise feels fine.  24  Pt of Dr. Lee. came today with 3 major complains: - hypoglycemia - "feels when his BG is low and high"; SMBG with contour meter - brought with him - readings range -s with most of the numbers WNR. POC BG - 85 mg/dl. It's not clear if the strips pt used are not  (did not have them with them). Is afraid he might "have medullary thyroid carcinoma". never had US done - Fear of thyroid ultrasound - " I would never be able to tolerate gel on my neck". Strates quit keto-diet; one day meals recall - too many simple carbs. Previous OGTT test was negative. - Stomach pain 10 out of 10; comes and goes, started "between April and September of last year". eating exacerbates it; does not have it if does not eat.  - Back pain (flank area) and shoulder pain - starts "when the sugar drops".  Of note, they stopped progesterone injections.

## 2024-01-26 NOTE — PHYSICAL EXAM
[Alert] : alert [No Acute Distress] : no acute distress [Healthy Appearance] : healthy appearance [Well Developed] : well developed [Normal Sclera/Conjunctiva] : normal sclera/conjunctiva [No Proptosis] : no proptosis [No Lid Lag] : no lid lag [No LAD] : no lymphadenopathy [No Neck Mass] : no neck mass was observed [Thyroid Not Enlarged] : the thyroid was not enlarged [No Respiratory Distress] : no respiratory distress [No Thyroid Nodules] : no palpable thyroid nodules [No Accessory Muscle Use] : no accessory muscle use [Normal Rate] : heart rate was normal [Normal Rate and Effort] : normal respiratory rate and effort [No Edema] : no peripheral edema [Spine Straight] : spine straight [No Stigmata of Cushings Syndrome] : no stigmata of Cushings Syndrome [No Clubbing, Cyanosis] : no clubbing  or cyanosis of the fingernails [Normal Gait] : normal gait [No Involuntary Movements] : no involuntary movements were seen [No Skin Lesions] : no skin lesions [No Joint Swelling] : no joint swelling seen [Abdominal Striae] : no abdominal striae [Acne] : no acne [Acanthosis Nigricans] : no acanthosis nigricans [No Tremors] : no tremors [Oriented x3] : oriented to person, place, and time

## 2024-01-30 ENCOUNTER — APPOINTMENT (OUTPATIENT)
Dept: INTERNAL MEDICINE | Facility: CLINIC | Age: 24
End: 2024-01-30

## 2024-01-31 ENCOUNTER — TRANSCRIPTION ENCOUNTER (OUTPATIENT)
Age: 24
End: 2024-01-31

## 2024-02-05 ENCOUNTER — TRANSCRIPTION ENCOUNTER (OUTPATIENT)
Age: 24
End: 2024-02-05

## 2024-02-05 LAB
AMYLASE/CREAT SERPL: 100 U/L
C PEPTIDE SERPL-MCNC: 1.6 NG/ML
ESTIMATED AVERAGE GLUCOSE: 97 MG/DL
GAD65 AB SER-MCNC: 0 NMOL/L
HBA1C MFR BLD HPLC: 5 %
INSULIN SERPL-MCNC: 7.3 UU/ML
LPL SERPL-CCNC: 31 U/L
PANC ISLET CELL AB SER QL: NORMAL
PROINSULIN SERPL-MCNC: 8 PMOL/L
T3 SERPL-MCNC: 105 NG/DL
T4 FREE SERPL-MCNC: 1 NG/DL
THYROGLOB AB SERPL-ACNC: <20 IU/ML
THYROPEROXIDASE AB SERPL IA-ACNC: <10 IU/ML
TSH SERPL-ACNC: 2.61 UIU/ML

## 2024-02-12 ENCOUNTER — APPOINTMENT (OUTPATIENT)
Dept: ENDOCRINOLOGY | Facility: CLINIC | Age: 24
End: 2024-02-12
Payer: MEDICAID

## 2024-02-12 VITALS — HEART RATE: 124 BPM | DIASTOLIC BLOOD PRESSURE: 70 MMHG | SYSTOLIC BLOOD PRESSURE: 97 MMHG | WEIGHT: 125 LBS

## 2024-02-12 PROCEDURE — 99212 OFFICE O/P EST SF 10 MIN: CPT

## 2024-02-12 RX ORDER — NEEDLES, DISPOSABLE 25GX5/8"
30G X 1" NEEDLE, DISPOSABLE MISCELLANEOUS
Qty: 50 | Refills: 3 | Status: ACTIVE | COMMUNITY
Start: 2020-02-20 | End: 1900-01-01

## 2024-02-12 NOTE — PHYSICAL EXAM
[Alert] : alert [Well Nourished] : well nourished [Healthy Appearance] : healthy appearance [No Acute Distress] : no acute distress [Normal Sclera/Conjunctiva] : normal sclera/conjunctiva [PERRL] : pupils equal, round and reactive to light [No Proptosis] : no proptosis [No Respiratory Distress] : no respiratory distress [No Accessory Muscle Use] : no accessory muscle use [Normal Rate and Effort] : normal respiratory rate and effort [Normal Rate] : heart rate was normal [No Stigmata of Cushings Syndrome] : no stigmata of Cushings Syndrome [Normal Gait] : normal gait [No Clubbing, Cyanosis] : no clubbing  or cyanosis of the fingernails [No Involuntary Movements] : no involuntary movements were seen [No Joint Swelling] : no joint swelling seen [Acanthosis Nigricans] : no acanthosis nigricans [No Tremors] : no tremors [Oriented x3] : oriented to person, place, and time

## 2024-02-12 NOTE — ASSESSMENT
[FreeTextEntry1] : Labs discussed in detail - pt assured he does not have diabetes, prediabetes or any other test indications for insulinoma or medullary carcinoma. Glycemic fluctuations; normal range; CGM report results discussed and explained at length. Refills sent. F/U with Dr. Lee in 6 mo.

## 2024-02-12 NOTE — HISTORY OF PRESENT ILLNESS
[FreeTextEntry1] : Pt of Dr. Lee, called to discuss recent lab results. Would like to bring his testosterone to below 60. Would like to increase the dose of  estradiol to 15 mg/week. C/o "breast feels too flat". Otherwise feels fine.  24 MK Pt of Dr. Lee. came today with 3 major complains: - hypoglycemia - "feels when his BG is low and high"; SMBG with contour meter - brought with him - readings range -s with most of the numbers WNR. POC BG - 85 mg/dl. It's not clear if the strips pt used are not  (did not have them with them). Is afraid he might "have medullary thyroid carcinoma". never had US done - Fear of thyroid ultrasound - " I would never be able to tolerate gel on my neck". Strates quit keto-diet; one day meals recall - too many simple carbs. Previous OGTT test was negative. - Stomach pain 10 out of 10; comes and goes, started "between April and September of last year". eating exacerbates it; does not have it if does not eat.  - Back pain (flank area) and shoulder pain - starts "when the sugar drops".  Of note, they stopped progesterone injections.  24 MK F/U on lab results - pt was concerned they had insulinoma or "medullary carcinoma". Brought both CGM and contour meter. Wanted to discuss other possible their glycemic fluctuations. States spoke to PCP, who recommended low carb high protein small 6 meal a day diet. Needs refills for some of his meds.

## 2024-02-29 ENCOUNTER — APPOINTMENT (OUTPATIENT)
Dept: DERMATOLOGY | Facility: CLINIC | Age: 24
End: 2024-02-29
Payer: MEDICAID

## 2024-02-29 DIAGNOSIS — L70.0 ACNE VULGARIS: ICD-10-CM

## 2024-02-29 DIAGNOSIS — L60.8 OTHER NAIL DISORDERS: ICD-10-CM

## 2024-02-29 DIAGNOSIS — L30.0 NUMMULAR DERMATITIS: ICD-10-CM

## 2024-02-29 DIAGNOSIS — L81.9 DISORDER OF PIGMENTATION, UNSPECIFIED: ICD-10-CM

## 2024-02-29 PROCEDURE — 99214 OFFICE O/P EST MOD 30 MIN: CPT

## 2024-02-29 RX ORDER — TAZAROTENE 0.5 MG/G
0.05 GEL CUTANEOUS DAILY
Qty: 1 | Refills: 11 | Status: ACTIVE | COMMUNITY
Start: 2023-08-24 | End: 1900-01-01

## 2024-02-29 NOTE — HISTORY OF PRESENT ILLNESS
[FreeTextEntry1] : FU nail, acne  [de-identified] : here to recheck spot on nail - no change acne nose and back using tazarotene 0.05 doesnt feel like works wants stronger  requesting rx for calcipotriol and sal acid and urea for acne/dry skin

## 2024-02-29 NOTE — PHYSICAL EXAM
[Alert] : alert [Well Nourished] : well nourished [Oriented x 3] : ~L oriented x 3 [No Clubbing] : no clubbing [Conjunctiva Non-injected] : conjunctiva non-injected [No Visual Lymphadenopathy] : no visual  lymphadenopathy [No Bromhidrosis] : no bromhidrosis [No Edema] : no edema [No Chromhidrosis] : no chromhidrosis [FreeTextEntry3] : open comedones nose hyperpigmented macules back, few inflammatory papules  faintly rough annular plaque leg <1 mm faint hyperpigmented band on fingernail, does not extend to base of nail fold

## 2024-03-13 ENCOUNTER — APPOINTMENT (OUTPATIENT)
Dept: ENDOCRINOLOGY | Facility: CLINIC | Age: 24
End: 2024-03-13

## 2024-03-21 ENCOUNTER — APPOINTMENT (OUTPATIENT)
Dept: INTERNAL MEDICINE | Facility: CLINIC | Age: 24
End: 2024-03-21
Payer: MEDICAID

## 2024-03-21 VITALS
RESPIRATION RATE: 16 BRPM | HEIGHT: 70 IN | HEART RATE: 57 BPM | WEIGHT: 125 LBS | DIASTOLIC BLOOD PRESSURE: 67 MMHG | SYSTOLIC BLOOD PRESSURE: 109 MMHG | BODY MASS INDEX: 17.9 KG/M2 | OXYGEN SATURATION: 100 % | TEMPERATURE: 97.5 F

## 2024-03-21 DIAGNOSIS — Z79.899 OTHER LONG TERM (CURRENT) DRUG THERAPY: ICD-10-CM

## 2024-03-21 DIAGNOSIS — Z11.3 ENCOUNTER FOR SCREENING FOR INFECTIONS WITH A PREDOMINANTLY SEXUAL MODE OF TRANSMISSION: ICD-10-CM

## 2024-03-21 DIAGNOSIS — N64.4 MASTODYNIA: ICD-10-CM

## 2024-03-21 DIAGNOSIS — E34.9 ENDOCRINE DISORDER, UNSPECIFIED: ICD-10-CM

## 2024-03-21 DIAGNOSIS — R89.9 UNSPECIFIED ABNORMAL FINDING IN SPECIMENS FROM OTHER ORGANS, SYSTEMS AND TISSUES: ICD-10-CM

## 2024-03-21 PROCEDURE — 36415 COLL VENOUS BLD VENIPUNCTURE: CPT

## 2024-03-21 PROCEDURE — 99214 OFFICE O/P EST MOD 30 MIN: CPT

## 2024-03-21 PROCEDURE — G2211 COMPLEX E/M VISIT ADD ON: CPT | Mod: NC,1L

## 2024-03-22 PROBLEM — N64.4 BREAST TENDERNESS: Status: ACTIVE | Noted: 2024-03-22

## 2024-03-22 NOTE — HISTORY OF PRESENT ILLNESS
[FreeTextEntry8] : sugar fluctuation. - has not had low blood sugar with recent diet modifications - for the most part not sx.  - glucometer log reviewed w/ pt FS ranged from 58-140s  GAHRT - on estrogen IM - concerned will be affecting their cholesterol - also noted increased breast tenderness and mild asymmetry a/w redness over upper left breast

## 2024-03-22 NOTE — PHYSICAL EXAM
[No Acute Distress] : no acute distress [No Axillary Lymphadenopathy] : no axillary lymphadenopathy [de-identified] : mild tenderness to palpation, no discrete nodules,

## 2024-03-27 ENCOUNTER — TRANSCRIPTION ENCOUNTER (OUTPATIENT)
Age: 24
End: 2024-03-27

## 2024-03-27 LAB
ALBUMIN SERPL ELPH-MCNC: 4.8 G/DL
ALP BLD-CCNC: 53 U/L
ALT SERPL-CCNC: 9 U/L
ANION GAP SERPL CALC-SCNC: 14 MMOL/L
AST SERPL-CCNC: 13 U/L
BASOPHILS # BLD AUTO: 0.06 K/UL
BASOPHILS NFR BLD AUTO: 0.6 %
BILIRUB SERPL-MCNC: 0.4 MG/DL
BUN SERPL-MCNC: 9 MG/DL
C TRACH RRNA SPEC QL NAA+PROBE: NOT DETECTED
CALCIUM SERPL-MCNC: 9.9 MG/DL
CHLORIDE SERPL-SCNC: 102 MMOL/L
CHOLEST SERPL-MCNC: 153 MG/DL
CO2 SERPL-SCNC: 25 MMOL/L
CREAT SERPL-MCNC: 0.7 MG/DL
EGFR: 134 ML/MIN/1.73M2
EOSINOPHIL # BLD AUTO: 0.12 K/UL
EOSINOPHIL NFR BLD AUTO: 1.2 %
ESTIMATED AVERAGE GLUCOSE: 97 MG/DL
ESTRADIOL SERPL-MCNC: 217 PG/ML
GLUCOSE SERPL-MCNC: 91 MG/DL
HBA1C MFR BLD HPLC: 5 %
HCT VFR BLD CALC: 39.4 %
HDLC SERPL-MCNC: 52 MG/DL
HGB BLD-MCNC: 13.1 G/DL
HIV1+2 AB SPEC QL IA.RAPID: NONREACTIVE
IGF BP1 SERPL-MCNC: 247 NG/ML
IGF-1 INTERP: NORMAL
IGF-I BLD-MCNC: 253 NG/ML
IMM GRANULOCYTES NFR BLD AUTO: 0.2 %
LDLC SERPL CALC-MCNC: 87 MG/DL
LYMPHOCYTES # BLD AUTO: 2.5 K/UL
LYMPHOCYTES NFR BLD AUTO: 25.9 %
MAN DIFF?: NORMAL
MCHC RBC-ENTMCNC: 29.2 PG
MCHC RBC-ENTMCNC: 33.2 GM/DL
MCV RBC AUTO: 87.9 FL
MONOCYTES # BLD AUTO: 0.42 K/UL
MONOCYTES NFR BLD AUTO: 4.4 %
N GONORRHOEA RRNA SPEC QL NAA+PROBE: NOT DETECTED
NEUTROPHILS # BLD AUTO: 6.52 K/UL
NEUTROPHILS NFR BLD AUTO: 67.7 %
NONHDLC SERPL-MCNC: 101 MG/DL
PLATELET # BLD AUTO: 312 K/UL
POTASSIUM SERPL-SCNC: 4.2 MMOL/L
PROT SERPL-MCNC: 7.4 G/DL
RBC # BLD: 4.48 M/UL
RBC # FLD: 13.5 %
SODIUM SERPL-SCNC: 141 MMOL/L
SOURCE AMPLIFICATION: NORMAL
SOURCE AMPLIFICATION: NORMAL
SOURCE ANAL: NORMAL
SOURCE ORAL: NORMAL
T PALLIDUM AB SER QL IA: NEGATIVE
T VAGINALIS RRNA SPEC QL NAA+PROBE: NOT DETECTED
TESTOST FREE SERPL-MCNC: 4.8 PG/ML
TESTOST SERPL-MCNC: 32 NG/DL
TRIGL SERPL-MCNC: 72 MG/DL
WBC # FLD AUTO: 9.64 K/UL

## 2024-03-28 LAB — SHBG-ESOTERIX: 103 NMOL/L

## 2024-04-05 ENCOUNTER — APPOINTMENT (OUTPATIENT)
Dept: ENDOCRINOLOGY | Facility: CLINIC | Age: 24
End: 2024-04-05

## 2024-04-15 ENCOUNTER — APPOINTMENT (OUTPATIENT)
Dept: INTERNAL MEDICINE | Facility: CLINIC | Age: 24
End: 2024-04-15

## 2024-04-18 ENCOUNTER — APPOINTMENT (OUTPATIENT)
Dept: ENDOCRINOLOGY | Facility: CLINIC | Age: 24
End: 2024-04-18

## 2024-06-06 ENCOUNTER — APPOINTMENT (OUTPATIENT)
Dept: ENDOCRINOLOGY | Facility: CLINIC | Age: 24
End: 2024-06-06
Payer: MEDICAID

## 2024-06-06 ENCOUNTER — TRANSCRIPTION ENCOUNTER (OUTPATIENT)
Age: 24
End: 2024-06-06

## 2024-06-06 VITALS — DIASTOLIC BLOOD PRESSURE: 66 MMHG | WEIGHT: 117 LBS | SYSTOLIC BLOOD PRESSURE: 108 MMHG | HEART RATE: 72 BPM

## 2024-06-06 DIAGNOSIS — F64.0 TRANSSEXUALISM: ICD-10-CM

## 2024-06-06 PROCEDURE — G2211 COMPLEX E/M VISIT ADD ON: CPT | Mod: NC,1L

## 2024-06-06 PROCEDURE — 99214 OFFICE O/P EST MOD 30 MIN: CPT

## 2024-06-06 RX ORDER — ESTRADIOL 2 MG/1
2 TABLET ORAL
Qty: 90 | Refills: 5 | Status: DISCONTINUED | COMMUNITY
Start: 2023-09-20 | End: 2024-06-06

## 2024-06-06 RX ORDER — PROGESTERONE 100 MG/1
100 CAPSULE ORAL
Qty: 270 | Refills: 3 | Status: DISCONTINUED | COMMUNITY
Start: 2021-09-24 | End: 2024-06-06

## 2024-06-06 RX ORDER — CLINDAMYCIN PHOSPHATE 10 MG/ML
1 SOLUTION TOPICAL
Qty: 60 | Refills: 0 | Status: DISCONTINUED | COMMUNITY
Start: 2020-12-16 | End: 2024-06-06

## 2024-06-06 RX ORDER — PROGESTERONE 100 MG/1
100 CAPSULE ORAL
Qty: 270 | Refills: 1 | Status: DISCONTINUED | COMMUNITY
Start: 2020-02-20 | End: 2024-06-06

## 2024-06-06 RX ORDER — ESTRADIOL 0.5 MG/1
0.5 TABLET ORAL
Qty: 150 | Refills: 3 | Status: DISCONTINUED | COMMUNITY
Start: 2020-03-27 | End: 2024-06-06

## 2024-06-06 RX ORDER — LANCETS 33 GAUGE
EACH MISCELLANEOUS
Qty: 100 | Refills: 11 | Status: DISCONTINUED | COMMUNITY
Start: 2024-01-26 | End: 2024-06-06

## 2024-06-06 RX ORDER — DUTASTERIDE 0.5 MG/1
0.5 CAPSULE, LIQUID FILLED ORAL
Qty: 30 | Refills: 4 | Status: DISCONTINUED | COMMUNITY
Start: 2020-02-20 | End: 2024-06-06

## 2024-06-06 RX ORDER — SYRINGE WITH NEEDLE, 1 ML 25GX5/8"
22G X 1-1/2" SYRINGE, EMPTY DISPOSABLE MISCELLANEOUS
Qty: 20 | Refills: 0 | Status: DISCONTINUED | COMMUNITY
Start: 2023-12-20 | End: 2024-06-06

## 2024-06-06 RX ORDER — FINASTERIDE 5 MG/1
5 TABLET, FILM COATED ORAL
Refills: 0 | Status: DISCONTINUED | COMMUNITY
Start: 2021-02-24 | End: 2024-06-06

## 2024-06-06 RX ORDER — SYRINGE, DISPOSABLE, 1 ML
1 ML SYRINGE, EMPTY DISPOSABLE MISCELLANEOUS
Qty: 15 | Refills: 11 | Status: DISCONTINUED | COMMUNITY
Start: 2020-02-20 | End: 2024-06-06

## 2024-06-06 RX ORDER — ESTRADIOL CYPIONATE 5 MG/ML
5 INJECTION INTRAMUSCULAR
Qty: 15 | Refills: 3 | Status: ACTIVE | COMMUNITY
Start: 2021-02-24 | End: 1900-01-01

## 2024-06-06 RX ORDER — ESTRADIOL 1 MG/G
1 GEL TOPICAL
Qty: 3 | Refills: 3 | Status: DISCONTINUED | COMMUNITY
Start: 2020-11-17 | End: 2024-06-06

## 2024-06-06 RX ORDER — PEN NEEDLE, DIABETIC 29 G X1/2"
32G X 4 MM NEEDLE, DISPOSABLE MISCELLANEOUS
Qty: 1 | Refills: 11 | Status: DISCONTINUED | COMMUNITY
Start: 2021-02-24 | End: 2024-06-06

## 2024-06-06 RX ORDER — ESTRADIOL VALERATE 10 MG/ML
10 INJECTION INTRAMUSCULAR
Qty: 1 | Refills: 0 | Status: DISCONTINUED | COMMUNITY
Start: 2023-09-01 | End: 2024-06-06

## 2024-06-06 RX ORDER — PROGESTERONE 50 MG/ML
50 INJECTION, SOLUTION INTRAMUSCULAR
Qty: 20 | Refills: 0 | Status: DISCONTINUED | COMMUNITY
Start: 2023-12-20 | End: 2024-06-06

## 2024-06-06 RX ORDER — BLOOD-GLUCOSE METER
W/DEVICE KIT MISCELLANEOUS
Qty: 1 | Refills: 0 | Status: DISCONTINUED | COMMUNITY
Start: 2023-10-19 | End: 2024-06-06

## 2024-06-06 NOTE — HISTORY OF PRESENT ILLNESS
[FreeTextEntry1] : This was a telephone appointment. I obtained the patient's consent. 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 They are on estrogen tx as they are considering being transgender however they are stating that they are still trying to "figure this  out". They are under care of a psychotherapist and in a supportive family environment. Gerber is known to have a pituitary microadenoma (3 mm on the MRI of 12/12/2019). Endocrine w/u has been negative for a functioning pituitary adenoma so far except for PRL level of 30.3 ng/ml on 8/21/2020(on estrogen tx). In addition Gerber reports a previous OGTT during which GH level reportedly johnny paradoxically - Gerber will forward the results to me. Gerber is on tx with injectable and oral estrogen and would like to start also Estragel. The latest E2 level was 85. Gerber is also on micronized progesterone and a 5-alpha-reductase inhibitor. Gerber c/o occasional headaches and nausea. 11/17/2020. This was a telephone appointment; I obtained the patient's consent. Gerber is doing well but c/o excessive hair on the face and body. OGTT tests failed to reveal evidence for acromegaly. 2/23/21. The patient is doing well overall. The excessive hair growth (which was his major complaint) is improving with electrolysis. He would like to adjust his oral estradiol tx and switch from estradiol valerate to depo estradiol cipionate. 4/21/21. Gerber is doing well self-injecting estradiol cipionate. E2 level was 105 on 4/5/21. PRL level was 21.  Last pituitary MRI was on 12/12/19 - c/w pituitary microadenoma. Gerber is interested in doing  semen analysis. 5/31/23. Current pronouns are he/him and they. The patient has stopped hormone therapy about 1 yr ago but states that he felt much better on estradiol tx and would like to restart this tx. He states that he is under the care of a therapist who agrees that estrogen tx should be restarted. 9/20/23. Current pronouns are they/them or he/him. Gerber has been using Estrogel 4 pumps (3mg) daily since the injectable estrogen (his first choice) is on back order. Doing well - is not interested in anti-androgen tx at this time. 12/20/23. Gerber c/o heat intolerance, excessive sweating. He would like to change his hormone regimen to injectable estrogen and progesterone preparations. He has lost 6 lb. His home situation is supportive. He is in the last 2 months of school for associate degree. 6/6/24. Gerber is doing very well on the current tx with depo-estradiol. He has lost 7 lb.

## 2024-06-06 NOTE — ASSESSMENT
[FreeTextEntry1] : Non-binary MTF transgender person.  Will continue current management. Medications refilled. F/U - 6 months. To see nutritionist.

## 2024-07-01 ENCOUNTER — APPOINTMENT (OUTPATIENT)
Dept: DERMATOLOGY | Facility: CLINIC | Age: 24
End: 2024-07-01

## 2024-07-24 ENCOUNTER — APPOINTMENT (OUTPATIENT)
Dept: DERMATOLOGY | Facility: CLINIC | Age: 24
End: 2024-07-24

## 2024-09-04 ENCOUNTER — APPOINTMENT (OUTPATIENT)
Dept: ENDOCRINOLOGY | Facility: CLINIC | Age: 24
End: 2024-09-04

## 2024-09-11 ENCOUNTER — APPOINTMENT (OUTPATIENT)
Dept: INTERNAL MEDICINE | Facility: CLINIC | Age: 24
End: 2024-09-11
Payer: MEDICAID

## 2024-09-11 VITALS
DIASTOLIC BLOOD PRESSURE: 55 MMHG | OXYGEN SATURATION: 99 % | TEMPERATURE: 98.1 F | BODY MASS INDEX: 17.19 KG/M2 | HEART RATE: 68 BPM | SYSTOLIC BLOOD PRESSURE: 93 MMHG | WEIGHT: 120.06 LBS | HEIGHT: 70 IN

## 2024-09-11 DIAGNOSIS — M54.50 LOW BACK PAIN, UNSPECIFIED: ICD-10-CM

## 2024-09-11 DIAGNOSIS — R31.29 OTHER MICROSCOPIC HEMATURIA: ICD-10-CM

## 2024-09-11 DIAGNOSIS — Z82.0 FAMILY HISTORY OF EPILEPSY AND OTHER DISEASES OF THE NERVOUS SYSTEM: ICD-10-CM

## 2024-09-11 DIAGNOSIS — L74.510 PRIMARY FOCAL HYPERHIDROSIS, AXILLA: ICD-10-CM

## 2024-09-11 DIAGNOSIS — M54.9 DORSALGIA, UNSPECIFIED: ICD-10-CM

## 2024-09-11 PROCEDURE — 36415 COLL VENOUS BLD VENIPUNCTURE: CPT

## 2024-09-11 PROCEDURE — G2211 COMPLEX E/M VISIT ADD ON: CPT | Mod: NC

## 2024-09-11 PROCEDURE — 99214 OFFICE O/P EST MOD 30 MIN: CPT

## 2024-09-11 RX ORDER — ALUMINUM CHLORIDE 20 %
20 SOLUTION, NON-ORAL TOPICAL
Qty: 1 | Refills: 1 | Status: ACTIVE | COMMUNITY
Start: 2024-09-11 | End: 1900-01-01

## 2024-09-11 RX ORDER — BLOOD SUGAR DIAGNOSTIC
STRIP MISCELLANEOUS
Qty: 200 | Refills: 11 | Status: ACTIVE | COMMUNITY
Start: 2024-09-11 | End: 1900-01-01

## 2024-09-11 RX ORDER — METHOCARBAMOL 500 MG/1
500 TABLET, FILM COATED ORAL 3 TIMES DAILY
Qty: 42 | Refills: 2 | Status: ACTIVE | COMMUNITY
Start: 2024-09-11 | End: 1900-01-01

## 2024-09-11 NOTE — PHYSICAL EXAM
[No Acute Distress] : no acute distress [No Respiratory Distress] : no respiratory distress  [No Spinal Tenderness] : no spinal tenderness [Normal] : affect was normal and insight and judgment were intact

## 2024-09-11 NOTE — HISTORY OF PRESENT ILLNESS
[FreeTextEntry1] : follow up [de-identified] : upper/lower back pain - present for past few days - intermittent, worse at rest, improves w/ movement - previously tx w/ methocarbamol - h/o scoliosis w/u in their late teens - resumed treatment w/ methocarbamol which they had at home; sx have improved  BS - no recent episodes of hypoglycemia - stopped keto diet 2 weeks ago - yesterday after fasting all day had sx c/w hypoglycemia which improved after eating  axillary hyperhidrosis - would like drisol

## 2024-09-16 LAB
APPEARANCE: ABNORMAL
BACTERIA UR CULT: NORMAL
BACTERIA: NEGATIVE /HPF
BILIRUBIN URINE: NEGATIVE
BLOOD URINE: NEGATIVE
C TRACH RRNA SPEC QL NAA+PROBE: NOT DETECTED
CAST: 0 /LPF
COLOR: YELLOW
EPITHELIAL CELLS: 4 /HPF
FOLATE SERPL-MCNC: 18.9 NG/ML
GLUCOSE QUALITATIVE U: NEGATIVE MG/DL
KETONES URINE: ABNORMAL MG/DL
LEUKOCYTE ESTERASE URINE: NEGATIVE
MICROSCOPIC-UA: NORMAL
N GONORRHOEA RRNA SPEC QL NAA+PROBE: NOT DETECTED
NITRITE URINE: NEGATIVE
PH URINE: 5.5
PROLACTIN SERPL-MCNC: 6.9 NG/ML
PROTEIN URINE: NORMAL MG/DL
RED BLOOD CELLS URINE: 0 /HPF
REVIEW: NORMAL
SOURCE AMPLIFICATION: NORMAL
SPECIFIC GRAVITY URINE: >1.03
UROBILINOGEN URINE: 0.2 MG/DL
VIT C SERPL-MCNC: 0.6 MG/DL
WHITE BLOOD CELLS URINE: 1 /HPF

## 2024-09-23 ENCOUNTER — APPOINTMENT (OUTPATIENT)
Dept: INTERNAL MEDICINE | Facility: CLINIC | Age: 24
End: 2024-09-23
Payer: MEDICAID

## 2024-09-23 VITALS
SYSTOLIC BLOOD PRESSURE: 91 MMHG | WEIGHT: 123 LBS | TEMPERATURE: 98.5 F | BODY MASS INDEX: 17.61 KG/M2 | HEART RATE: 62 BPM | OXYGEN SATURATION: 99 % | RESPIRATION RATE: 16 BRPM | HEIGHT: 70 IN | DIASTOLIC BLOOD PRESSURE: 50 MMHG

## 2024-09-23 DIAGNOSIS — M54.9 DORSALGIA, UNSPECIFIED: ICD-10-CM

## 2024-09-23 DIAGNOSIS — R14.2 ERUCTATION: ICD-10-CM

## 2024-09-23 DIAGNOSIS — R39.198 OTHER DIFFICULTIES WITH MICTURITION: ICD-10-CM

## 2024-09-23 DIAGNOSIS — R63.6 UNDERWEIGHT: ICD-10-CM

## 2024-09-23 DIAGNOSIS — R68.89 OTHER GENERAL SYMPTOMS AND SIGNS: ICD-10-CM

## 2024-09-23 PROCEDURE — 99214 OFFICE O/P EST MOD 30 MIN: CPT

## 2024-09-23 PROCEDURE — 36415 COLL VENOUS BLD VENIPUNCTURE: CPT

## 2024-09-23 PROCEDURE — G2211 COMPLEX E/M VISIT ADD ON: CPT | Mod: NC

## 2024-09-23 RX ORDER — OMEPRAZOLE 20 MG/1
20 CAPSULE, DELAYED RELEASE ORAL
Qty: 30 | Refills: 1 | Status: ACTIVE | COMMUNITY
Start: 2024-09-23 | End: 1900-01-01

## 2024-09-24 PROBLEM — R63.6 UNDERWEIGHT: Status: ACTIVE | Noted: 2024-09-23

## 2024-09-24 PROBLEM — R39.198 SUBJECTIVE CHANGE IN URINATION: Status: ACTIVE | Noted: 2024-09-23

## 2024-09-24 PROBLEM — R68.89 CHANGE IN WEIGHT: Status: ACTIVE | Noted: 2024-09-23

## 2024-09-24 PROBLEM — R14.2 BELCHING: Status: ACTIVE | Noted: 2024-09-23

## 2024-09-24 NOTE — PHYSICAL EXAM
[No Acute Distress] : no acute distress [Normal Sclera/Conjunctiva] : normal sclera/conjunctiva [No Respiratory Distress] : no respiratory distress  [No Joint Swelling] : no joint swelling [Normal] : no rash [Non-distended] : non-distended [No Masses] : no abdominal mass palpated [Normal Bowel Sounds] : normal bowel sounds [de-identified] : underweight [de-identified] : no step off  [de-identified] :  non specific spinal tenderness, mild tenderness over the L scapula, negative drop arm, negative lift off, negative empty can

## 2024-09-24 NOTE — END OF VISIT
[FreeTextEntry3] : I, Dr. Phoenix, personally performed the evaluation and management (E/M) services for this established patient who presents today with (a) new problem(s)/exacerbation of (an) existing condition(s).  That E/M includes conducting the examination, assessing all new/exacerbated conditions, and establishing a new plan of care.  Today, my PA, Joselyn Sewell, was here to observe my evaluation and management services for this new problem/exacerbated condition to be followed going forward.

## 2024-09-24 NOTE — PHYSICAL EXAM
[No Acute Distress] : no acute distress [Normal Sclera/Conjunctiva] : normal sclera/conjunctiva [No Respiratory Distress] : no respiratory distress  [No Joint Swelling] : no joint swelling [Normal] : no rash [Non-distended] : non-distended [No Masses] : no abdominal mass palpated [Normal Bowel Sounds] : normal bowel sounds [de-identified] : underweight [de-identified] : no step off  [de-identified] :  non specific spinal tenderness, mild tenderness over the L scapula, negative drop arm, negative lift off, negative empty can

## 2024-09-24 NOTE — HISTORY OF PRESENT ILLNESS
[FreeTextEntry1] : eval of multiple medical concerns [de-identified] : Pt is a 25 y/o genderqueer individual who presents to the office today for eval of multiple medical concerns  Urine: - clear in color which is making patient is worried about "bone in the urine" because they are underweight  - patient drinks 32 oz of water a day  - no fever, chills, dysuria, cloudiness, malodorous urine, hematuria, discharge, frequency, hesitancy  Back pain: - L upper back pain - pt concerned for misalignment  - tightness sensation - they were recommended PT and meds at last appointment but pt never started.  They had trouble contacting PT location - pain has been stable - no known trauma, pain does not radiate  weight: - pt is concerned about weight loss - as per vitals weight has been increasing - pt is still underweight and would like to know what they can do to help gain weight - pt fasts during the week, when they eat "a usual meal" they will have about 1300 calories  Belching: - past few days - a/w abdominal bloating - no abdominal pain - no nausea

## 2024-09-24 NOTE — HISTORY OF PRESENT ILLNESS
[FreeTextEntry1] : eval of multiple medical concerns [de-identified] : Pt is a 23 y/o genderqueer individual who presents to the office today for eval of multiple medical concerns  Urine: - clear in color which is making patient is worried about "bone in the urine" because they are underweight  - patient drinks 32 oz of water a day  - no fever, chills, dysuria, cloudiness, malodorous urine, hematuria, discharge, frequency, hesitancy  Back pain: - L upper back pain - pt concerned for misalignment  - tightness sensation - they were recommended PT and meds at last appointment but pt never started.  They had trouble contacting PT location - pain has been stable - no known trauma, pain does not radiate  weight: - pt is concerned about weight loss - as per vitals weight has been increasing - pt is still underweight and would like to know what they can do to help gain weight - pt fasts during the week, when they eat "a usual meal" they will have about 1300 calories  Belching: - past few days - a/w abdominal bloating - no abdominal pain - no nausea

## 2024-09-25 DIAGNOSIS — D64.9 ANEMIA, UNSPECIFIED: ICD-10-CM

## 2024-09-27 ENCOUNTER — TRANSCRIPTION ENCOUNTER (OUTPATIENT)
Age: 24
End: 2024-09-27

## 2024-09-27 LAB
ALBUMIN SERPL ELPH-MCNC: 4.7 G/DL
ALP BLD-CCNC: 44 U/L
ALT SERPL-CCNC: 9 U/L
ANION GAP SERPL CALC-SCNC: 11 MMOL/L
APPEARANCE: CLEAR
AST SERPL-CCNC: 15 U/L
BACTERIA: NEGATIVE /HPF
BASOPHILS # BLD AUTO: 0.05 K/UL
BASOPHILS NFR BLD AUTO: 0.7 %
BILIRUB SERPL-MCNC: 0.5 MG/DL
BILIRUBIN URINE: NEGATIVE
BLOOD URINE: NEGATIVE
BUN SERPL-MCNC: 9 MG/DL
CALCIUM SERPL-MCNC: 9.6 MG/DL
CAST: 0 /LPF
CHLORIDE SERPL-SCNC: 100 MMOL/L
CO2 SERPL-SCNC: 26 MMOL/L
COLOR: YELLOW
CREAT SERPL-MCNC: 0.69 MG/DL
CREAT SPEC-SCNC: 148 MG/DL
EGFR: 133 ML/MIN/1.73M2
EOSINOPHIL # BLD AUTO: 0.24 K/UL
EOSINOPHIL NFR BLD AUTO: 3.2 %
EPITHELIAL CELLS: 5 /HPF
FERRITIN SERPL-MCNC: 82 NG/ML
FOLATE SERPL-MCNC: >20 NG/ML
GLUCOSE QUALITATIVE U: NEGATIVE MG/DL
GLUCOSE SERPL-MCNC: 95 MG/DL
HCT VFR BLD CALC: 39.8 %
HGB BLD-MCNC: 12.7 G/DL
IMM GRANULOCYTES NFR BLD AUTO: 0.8 %
IRON SATN MFR SERPL: 28 %
IRON SERPL-MCNC: 92 UG/DL
KETONES URINE: NEGATIVE MG/DL
LEUKOCYTE ESTERASE URINE: NEGATIVE
LYMPHOCYTES # BLD AUTO: 2.26 K/UL
LYMPHOCYTES NFR BLD AUTO: 29.9 %
MAN DIFF?: NORMAL
MCHC RBC-ENTMCNC: 29.7 PG
MCHC RBC-ENTMCNC: 31.9 GM/DL
MCV RBC AUTO: 93.2 FL
MICROALBUMIN 24H UR DL<=1MG/L-MCNC: <1.2 MG/DL
MICROALBUMIN/CREAT 24H UR-RTO: NORMAL MG/G
MICROSCOPIC-UA: NORMAL
MONOCYTES # BLD AUTO: 0.39 K/UL
MONOCYTES NFR BLD AUTO: 5.2 %
NEUTROPHILS # BLD AUTO: 4.55 K/UL
NEUTROPHILS NFR BLD AUTO: 60.2 %
NITRITE URINE: NEGATIVE
OSMOLALITY SERPL: 287 MOSMOL/KG
OSMOLALITY UR: 698 MOSM/KG
PH URINE: 7
PLATELET # BLD AUTO: 270 K/UL
POTASSIUM SERPL-SCNC: 4.1 MMOL/L
PREALB SERPL NEPH-MCNC: 28 MG/DL
PROT SERPL-MCNC: 7.5 G/DL
PROTEIN URINE: NEGATIVE MG/DL
RBC # BLD: 4.27 M/UL
RBC # FLD: 13.1 %
RED BLOOD CELLS URINE: 1 /HPF
SODIUM SERPL-SCNC: 138 MMOL/L
SPECIFIC GRAVITY URINE: 1.02
TIBC SERPL-MCNC: 326 UG/DL
TSH SERPL-ACNC: 1.66 UIU/ML
UIBC SERPL-MCNC: 234 UG/DL
UREA BREATH TEST QL: NEGATIVE
UROBILINOGEN URINE: 0.2 MG/DL
VIT B12 SERPL-MCNC: 512 PG/ML
WBC # FLD AUTO: 7.55 K/UL
WHITE BLOOD CELLS URINE: 0 /HPF

## 2024-12-18 ENCOUNTER — APPOINTMENT (OUTPATIENT)
Dept: ENDOCRINOLOGY | Facility: CLINIC | Age: 24
End: 2024-12-18

## 2024-12-18 VITALS
DIASTOLIC BLOOD PRESSURE: 68 MMHG | SYSTOLIC BLOOD PRESSURE: 104 MMHG | HEIGHT: 69.69 IN | WEIGHT: 124 LBS | BODY MASS INDEX: 17.95 KG/M2 | HEART RATE: 85 BPM

## 2024-12-18 DIAGNOSIS — D35.2 BENIGN NEOPLASM OF PITUITARY GLAND: ICD-10-CM

## 2024-12-18 DIAGNOSIS — E34.9 ENDOCRINE DISORDER, UNSPECIFIED: ICD-10-CM

## 2024-12-18 DIAGNOSIS — E16.2 HYPOGLYCEMIA, UNSPECIFIED: ICD-10-CM

## 2024-12-18 DIAGNOSIS — D64.9 ANEMIA, UNSPECIFIED: ICD-10-CM

## 2024-12-18 DIAGNOSIS — F64.0 TRANSSEXUALISM: ICD-10-CM

## 2024-12-18 PROCEDURE — 36415 COLL VENOUS BLD VENIPUNCTURE: CPT

## 2024-12-18 PROCEDURE — 99214 OFFICE O/P EST MOD 30 MIN: CPT

## 2024-12-18 PROCEDURE — G2211 COMPLEX E/M VISIT ADD ON: CPT | Mod: NC

## 2024-12-18 RX ORDER — GAUZE BANDAGE 4" X 4"
1000 BANDAGE TOPICAL
Qty: 60 | Refills: 11 | Status: ACTIVE | COMMUNITY
Start: 2024-12-18 | End: 1900-01-01

## 2024-12-18 RX ORDER — PROGESTERONE 45 MG/1.125G
4 GEL VAGINAL
Qty: 30 | Refills: 11 | Status: ACTIVE | COMMUNITY
Start: 2024-12-18 | End: 1900-01-01

## 2024-12-26 ENCOUNTER — NON-APPOINTMENT (OUTPATIENT)
Age: 24
End: 2024-12-26

## 2024-12-26 LAB
ALBUMIN SERPL ELPH-MCNC: 4.8 G/DL
ALP BLD-CCNC: 40 U/L
ALT SERPL-CCNC: 10 U/L
ANION GAP SERPL CALC-SCNC: 13 MMOL/L
AST SERPL-CCNC: 13 U/L
BASOPHILS # BLD AUTO: 0.05 K/UL
BASOPHILS NFR BLD AUTO: 0.5 %
BILIRUB SERPL-MCNC: 0.4 MG/DL
BUN SERPL-MCNC: 13 MG/DL
CALCIUM SERPL-MCNC: 9.7 MG/DL
CHLORIDE SERPL-SCNC: 102 MMOL/L
CHOLEST SERPL-MCNC: 160 MG/DL
CO2 SERPL-SCNC: 24 MMOL/L
CREAT SERPL-MCNC: 0.83 MG/DL
CREAT SPEC-SCNC: 187 MG/DL
EGFR: 126 ML/MIN/1.73M2
EOSINOPHIL # BLD AUTO: 0.08 K/UL
EOSINOPHIL NFR BLD AUTO: 0.7 %
ESTIMATED AVERAGE GLUCOSE: 97 MG/DL
ESTRADIOL SERPL-MCNC: 137 PG/ML
FSH SERPL-MCNC: <0.3 IU/L
GLUCOSE SERPL-MCNC: 97 MG/DL
HBA1C MFR BLD HPLC: 5 %
HCT VFR BLD CALC: 37.9 %
HDLC SERPL-MCNC: 50 MG/DL
HGB BLD-MCNC: 12.6 G/DL
IMM GRANULOCYTES NFR BLD AUTO: 0.3 %
LDLC SERPL CALC-MCNC: 97 MG/DL
LH SERPL-ACNC: 0.6 IU/L
LYMPHOCYTES # BLD AUTO: 1.97 K/UL
LYMPHOCYTES NFR BLD AUTO: 18.3 %
MAN DIFF?: NORMAL
MCHC RBC-ENTMCNC: 30 PG
MCHC RBC-ENTMCNC: 33.2 G/DL
MCV RBC AUTO: 90.2 FL
MICROALBUMIN 24H UR DL<=1MG/L-MCNC: <1.2 MG/DL
MICROALBUMIN/CREAT 24H UR-RTO: NORMAL MG/G
MONOCYTES # BLD AUTO: 0.41 K/UL
MONOCYTES NFR BLD AUTO: 3.8 %
NEUTROPHILS # BLD AUTO: 8.22 K/UL
NEUTROPHILS NFR BLD AUTO: 76.4 %
NONHDLC SERPL-MCNC: 111 MG/DL
PLATELET # BLD AUTO: 298 K/UL
POTASSIUM SERPL-SCNC: 4.2 MMOL/L
PROLACTIN SERPL-MCNC: 12.2 NG/ML
PROT SERPL-MCNC: 7.4 G/DL
RBC # BLD: 4.2 M/UL
RBC # FLD: 12.9 %
SODIUM SERPL-SCNC: 140 MMOL/L
T3 SERPL-MCNC: 96 NG/DL
T4 FREE SERPL-MCNC: 1.1 NG/DL
TESTOST FREE SERPL-MCNC: 3.9 PG/ML
TESTOST SERPL-MCNC: 26.7 NG/DL
TRIGL SERPL-MCNC: 73 MG/DL
TSH SERPL-ACNC: 2.23 UIU/ML
WBC # FLD AUTO: 10.76 K/UL

## 2024-12-27 ENCOUNTER — TRANSCRIPTION ENCOUNTER (OUTPATIENT)
Age: 24
End: 2024-12-27

## 2025-01-02 ENCOUNTER — OUTPATIENT (OUTPATIENT)
Dept: OUTPATIENT SERVICES | Facility: HOSPITAL | Age: 25
LOS: 1 days | End: 2025-01-02
Payer: MEDICAID

## 2025-01-02 ENCOUNTER — RESULT REVIEW (OUTPATIENT)
Age: 25
End: 2025-01-02

## 2025-01-02 ENCOUNTER — OUTPATIENT (OUTPATIENT)
Dept: OUTPATIENT SERVICES | Facility: HOSPITAL | Age: 25
LOS: 1 days | End: 2025-01-02
Payer: COMMERCIAL

## 2025-01-02 DIAGNOSIS — Z00.8 ENCOUNTER FOR OTHER GENERAL EXAMINATION: ICD-10-CM

## 2025-01-02 DIAGNOSIS — D35.2 BENIGN NEOPLASM OF PITUITARY GLAND: ICD-10-CM

## 2025-01-02 DIAGNOSIS — Z13.820 ENCOUNTER FOR SCREENING FOR OSTEOPOROSIS: ICD-10-CM

## 2025-01-02 PROCEDURE — 70553 MRI BRAIN STEM W/O & W/DYE: CPT | Mod: 26

## 2025-01-02 PROCEDURE — 77080 DXA BONE DENSITY AXIAL: CPT

## 2025-01-02 PROCEDURE — 77080 DXA BONE DENSITY AXIAL: CPT | Mod: 26

## 2025-01-02 PROCEDURE — 70553 MRI BRAIN STEM W/O & W/DYE: CPT

## 2025-01-02 PROCEDURE — A9579: CPT

## 2025-01-03 DIAGNOSIS — D35.2 BENIGN NEOPLASM OF PITUITARY GLAND: ICD-10-CM

## 2025-01-03 DIAGNOSIS — Z13.820 ENCOUNTER FOR SCREENING FOR OSTEOPOROSIS: ICD-10-CM

## 2025-01-07 ENCOUNTER — APPOINTMENT (OUTPATIENT)
Dept: INTERNAL MEDICINE | Facility: CLINIC | Age: 25
End: 2025-01-07
Payer: COMMERCIAL

## 2025-01-07 VITALS
WEIGHT: 124 LBS | HEIGHT: 69.69 IN | BODY MASS INDEX: 17.95 KG/M2 | TEMPERATURE: 97.2 F | SYSTOLIC BLOOD PRESSURE: 88 MMHG | HEART RATE: 82 BPM | OXYGEN SATURATION: 100 % | DIASTOLIC BLOOD PRESSURE: 51 MMHG

## 2025-01-07 VITALS — DIASTOLIC BLOOD PRESSURE: 58 MMHG | SYSTOLIC BLOOD PRESSURE: 88 MMHG

## 2025-01-07 DIAGNOSIS — I95.9 HYPOTENSION, UNSPECIFIED: ICD-10-CM

## 2025-01-07 DIAGNOSIS — J02.9 ACUTE PHARYNGITIS, UNSPECIFIED: ICD-10-CM

## 2025-01-07 PROBLEM — E04.1 THYROID NODULE: Status: ACTIVE | Noted: 2025-01-07

## 2025-01-07 PROCEDURE — G2211 COMPLEX E/M VISIT ADD ON: CPT | Mod: NC

## 2025-01-07 PROCEDURE — 99214 OFFICE O/P EST MOD 30 MIN: CPT

## 2025-01-07 RX ORDER — LANCETS 28 GAUGE
EACH MISCELLANEOUS
Qty: 100 | Refills: 3 | Status: ACTIVE | COMMUNITY
Start: 2025-01-07 | End: 1900-01-01

## 2025-01-07 RX ORDER — BLOOD SUGAR DIAGNOSTIC
STRIP MISCELLANEOUS DAILY
Qty: 1 | Refills: 3 | Status: ACTIVE | COMMUNITY
Start: 2025-01-07 | End: 1900-01-01

## 2025-01-07 RX ORDER — BLOOD-GLUCOSE METER
W/DEVICE KIT MISCELLANEOUS
Qty: 1 | Refills: 0 | Status: ACTIVE | COMMUNITY
Start: 2025-01-07 | End: 1900-01-01

## 2025-01-10 ENCOUNTER — TRANSCRIPTION ENCOUNTER (OUTPATIENT)
Age: 25
End: 2025-01-10

## 2025-01-10 LAB
APPEARANCE: CLEAR
BACTERIA THROAT CULT: NORMAL
BILIRUBIN URINE: NEGATIVE
BLOOD URINE: NEGATIVE
C TRACH RRNA SPEC QL NAA+PROBE: NOT DETECTED
COLOR: YELLOW
GLUCOSE QUALITATIVE U: NEGATIVE MG/DL
KETONES URINE: NEGATIVE MG/DL
LEUKOCYTE ESTERASE URINE: NEGATIVE
N GONORRHOEA RRNA SPEC QL NAA+PROBE: NOT DETECTED
NITRITE URINE: NEGATIVE
OSMOLALITY UR: 617 MOSM/KG
PH URINE: 6
PROTEIN URINE: NEGATIVE MG/DL
SOURCE ORAL: NORMAL
SPECIFIC GRAVITY URINE: 1.02
UROBILINOGEN URINE: 0.2 MG/DL

## 2025-01-14 ENCOUNTER — APPOINTMENT (OUTPATIENT)
Dept: NEPHROLOGY | Facility: CLINIC | Age: 25
End: 2025-01-14
Payer: COMMERCIAL

## 2025-01-14 DIAGNOSIS — R31.29 OTHER MICROSCOPIC HEMATURIA: ICD-10-CM

## 2025-01-14 DIAGNOSIS — E34.9 ENDOCRINE DISORDER, UNSPECIFIED: ICD-10-CM

## 2025-01-14 DIAGNOSIS — R68.89 OTHER GENERAL SYMPTOMS AND SIGNS: ICD-10-CM

## 2025-01-14 DIAGNOSIS — I95.9 HYPOTENSION, UNSPECIFIED: ICD-10-CM

## 2025-01-14 PROCEDURE — 99205 OFFICE O/P NEW HI 60 MIN: CPT

## 2025-01-14 PROCEDURE — G2211 COMPLEX E/M VISIT ADD ON: CPT | Mod: NC

## 2025-01-15 ENCOUNTER — APPOINTMENT (OUTPATIENT)
Dept: INTERNAL MEDICINE | Facility: CLINIC | Age: 25
End: 2025-01-15
Payer: MEDICAID

## 2025-01-15 VITALS
WEIGHT: 124 LBS | DIASTOLIC BLOOD PRESSURE: 61 MMHG | OXYGEN SATURATION: 100 % | HEART RATE: 93 BPM | TEMPERATURE: 97.7 F | HEIGHT: 69.69 IN | BODY MASS INDEX: 17.95 KG/M2 | RESPIRATION RATE: 17 BRPM | SYSTOLIC BLOOD PRESSURE: 94 MMHG

## 2025-01-15 DIAGNOSIS — E04.1 NONTOXIC SINGLE THYROID NODULE: ICD-10-CM

## 2025-01-15 DIAGNOSIS — Z23 ENCOUNTER FOR IMMUNIZATION: ICD-10-CM

## 2025-01-15 DIAGNOSIS — R14.2 ERUCTATION: ICD-10-CM

## 2025-01-15 PROCEDURE — 99213 OFFICE O/P EST LOW 20 MIN: CPT

## 2025-01-15 PROCEDURE — G2211 COMPLEX E/M VISIT ADD ON: CPT | Mod: NC

## 2025-01-21 VITALS — RESPIRATION RATE: 16 BRPM | SYSTOLIC BLOOD PRESSURE: 90 MMHG | HEART RATE: 94 BPM | DIASTOLIC BLOOD PRESSURE: 60 MMHG

## 2025-01-22 LAB
24R-OH-CALCIDIOL SERPL-MCNC: 57.7 PG/ML
25(OH)D3 SERPL-MCNC: 29.5 NG/ML
ALBUMIN MFR SERPL ELPH: 62.5 %
ALBUMIN SERPL ELPH-MCNC: 4.7 G/DL
ALBUMIN SERPL-MCNC: 4.8 G/DL
ALBUMIN/GLOB SERPL: 1.7 RATIO
ALDOSTERONE SERUM: 8.1 NG/DL
ALDOSTERONE/RENIN RATIO: 0.6 RATIO
ALP BLD-CCNC: 42 U/L
ALPHA1 GLOB MFR SERPL ELPH: 3.4 %
ALPHA1 GLOB SERPL ELPH-MCNC: 0.3 G/DL
ALPHA2 GLOB MFR SERPL ELPH: 8.5 %
ALPHA2 GLOB SERPL ELPH-MCNC: 0.7 G/DL
ALT SERPL-CCNC: 11 U/L
ANION GAP SERPL CALC-SCNC: 14 MMOL/L
APPEARANCE: CLEAR
AST SERPL-CCNC: 11 U/L
B-GLOBULIN MFR SERPL ELPH: 9.8 %
B-GLOBULIN SERPL ELPH-MCNC: 0.8 G/DL
BACTERIA UR CULT: NORMAL
BACTERIA: NEGATIVE /HPF
BASOPHILS # BLD AUTO: 0.05 K/UL
BASOPHILS NFR BLD AUTO: 0.5 %
BILIRUB SERPL-MCNC: 0.7 MG/DL
BILIRUBIN URINE: NEGATIVE
BLOOD URINE: NEGATIVE
BUN SERPL-MCNC: 13 MG/DL
C3 SERPL-MCNC: 104 MG/DL
C4 SERPL-MCNC: 13 MG/DL
CALCIUM ?TM UR-MCNC: 14.5 MG/DL
CALCIUM SERPL-MCNC: 9.5 MG/DL
CALCIUM SERPL-MCNC: 9.5 MG/DL
CAST: 0 /LPF
CHLORIDE SERPL-SCNC: 102 MMOL/L
CO2 SERPL-SCNC: 25 MMOL/L
COLLAGEN NTX UR-SCNC: 1101 NMOL BCE
COLLAGEN NTX/CREAT UR-SRTO: 44
COLOR: YELLOW
CREAT SERPL-MCNC: 0.76 MG/DL
CREAT SPEC-SCNC: 311 MG/DL
CREAT SPEC-SCNC: 311 MG/DL
CREAT UR-MCNC: 280.8 MG/DL
CREAT/PROT UR: 0.1 RATIO
CYSTATIN C SERPL-MCNC: 0.71 MG/L
DEPRECATED KAPPA LC FREE/LAMBDA SER: 1.44 RATIO
EGFR: 130 ML/MIN/1.73M2
EOSINOPHIL # BLD AUTO: 0.07 K/UL
EOSINOPHIL NFR BLD AUTO: 0.7 %
EPITHELIAL CELLS: 3 /HPF
ESTIMATED AVERAGE GLUCOSE: 100 MG/DL
FRUCTOSAMINE SERPL-MCNC: 226 UMOL/L
GAMMA GLOB FLD ELPH-MCNC: 1.2 G/DL
GAMMA GLOB MFR SERPL ELPH: 15.8 %
GFR/BSA.PRED SERPLBLD CYS-BASED-ARV: 129 ML/MIN/1.73M2
GLUCOSE QUALITATIVE U: NEGATIVE MG/DL
GLUCOSE SERPL-MCNC: 88 MG/DL
HBA1C MFR BLD HPLC: 5.1 %
HCT VFR BLD CALC: 40 %
HGB BLD-MCNC: 13.1 G/DL
IGA SER QL IEP: 117 MG/DL
IGG SER QL IEP: 1150 MG/DL
IGM SER QL IEP: 221 MG/DL
IMM GRANULOCYTES NFR BLD AUTO: 0.2 %
INTERPRETATION SERPL IEP-IMP: NORMAL
INTERPRETIVE GUIDE:: NORMAL
KAPPA LC CSF-MCNC: 1.46 MG/DL
KAPPA LC SERPL-MCNC: 2.1 MG/DL
KETONES URINE: 15 MG/DL
LEUKOCYTE ESTERASE URINE: NEGATIVE
LYMPHOCYTES # BLD AUTO: 2.06 K/UL
LYMPHOCYTES NFR BLD AUTO: 20.5 %
M PROTEIN SPEC IFE-MCNC: NORMAL
MAGNESIUM SERPL-MCNC: 2 MG/DL
MAN DIFF?: NORMAL
MCHC RBC-ENTMCNC: 29.2 PG
MCHC RBC-ENTMCNC: 32.8 G/DL
MCV RBC AUTO: 89.3 FL
MICROALBUMIN 24H UR DL<=1MG/L-MCNC: <1.2 MG/DL
MICROALBUMIN/CREAT 24H UR-RTO: NORMAL MG/G
MICROSCOPIC-UA: NORMAL
MONOCYTES # BLD AUTO: 0.39 K/UL
MONOCYTES NFR BLD AUTO: 3.9 %
NEUTROPHILS # BLD AUTO: 7.47 K/UL
NEUTROPHILS NFR BLD AUTO: 74.2 %
NITRITE URINE: NEGATIVE
OSMOLALITY UR: 1026 MOSM/KG
PARATHYROID HORMONE INTACT: 22 PG/ML
PH URINE: 6
PHOSPHATE SERPL-MCNC: 3.8 MG/DL
PLATELET # BLD AUTO: 331 K/UL
POTASSIUM SERPL-SCNC: 3.9 MMOL/L
PROT SERPL-MCNC: 7.7 G/DL
PROT UR-MCNC: 18 MG/DL
PROTEIN URINE: NORMAL MG/DL
RBC # BLD: 4.48 M/UL
RBC # FLD: 13.1 %
RED BLOOD CELLS URINE: 1 /HPF
RENIN ACTIVITY, PLASMA: 0.37 NG/ML/HR
SODIUM ?TM SUB UR QN: 205 MMOL/L
SODIUM SERPL-SCNC: 141 MMOL/L
SPECIFIC GRAVITY URINE: >1.03
URATE SERPL-MCNC: 6.1 MG/DL
URATE UR-MCNC: 75.1 MG/DL
UROBILINOGEN URINE: 1 MG/DL
WBC # FLD AUTO: 10.06 K/UL
WHITE BLOOD CELLS URINE: 0 /HPF

## 2025-01-24 LAB
CREAT UR-MCNC: 298 MG/DL
METANEPHS 24H UR-MCNC: 233 UG/L
METANEPHS/CREAT UR: 0.2
NORMETANEPHRINE 24H UR-MCNC: 276 UG/L

## 2025-01-27 ENCOUNTER — RX RENEWAL (OUTPATIENT)
Age: 25
End: 2025-01-27

## 2025-01-27 RX ORDER — ISOPROPYL ALCOHOL 70 ML/100ML
70 SWAB TOPICAL
Qty: 200 | Refills: 0 | Status: ACTIVE | COMMUNITY
Start: 2025-01-27 | End: 1900-01-01

## 2025-01-29 ENCOUNTER — APPOINTMENT (OUTPATIENT)
Dept: OTOLARYNGOLOGY | Facility: CLINIC | Age: 25
End: 2025-01-29
Payer: MEDICAID

## 2025-01-29 VITALS
WEIGHT: 123 LBS | DIASTOLIC BLOOD PRESSURE: 78 MMHG | BODY MASS INDEX: 17.81 KG/M2 | SYSTOLIC BLOOD PRESSURE: 109 MMHG | HEART RATE: 91 BPM | TEMPERATURE: 97.2 F | HEIGHT: 69.69 IN

## 2025-01-29 DIAGNOSIS — R49.8 OTHER VOICE AND RESONANCE DISORDERS: ICD-10-CM

## 2025-01-29 DIAGNOSIS — E04.1 NONTOXIC SINGLE THYROID NODULE: ICD-10-CM

## 2025-01-29 DIAGNOSIS — R22.1 LOCALIZED SWELLING, MASS AND LUMP, NECK: ICD-10-CM

## 2025-01-29 DIAGNOSIS — R10.9 UNSPECIFIED ABDOMINAL PAIN: ICD-10-CM

## 2025-01-29 DIAGNOSIS — R09.82 POSTNASAL DRIP: ICD-10-CM

## 2025-01-29 DIAGNOSIS — Z87.09 PERSONAL HISTORY OF OTHER DISEASES OF THE RESPIRATORY SYSTEM: ICD-10-CM

## 2025-01-29 PROCEDURE — 31575 DIAGNOSTIC LARYNGOSCOPY: CPT

## 2025-02-03 PROBLEM — R09.82 POSTNASAL DRIP: Status: ACTIVE | Noted: 2025-02-03

## 2025-02-03 PROBLEM — Z87.09 HISTORY OF SORE THROAT: Status: RESOLVED | Noted: 2023-11-16 | Resolved: 2025-02-03

## 2025-02-03 PROBLEM — R10.9 STOMACH PAIN: Status: RESOLVED | Noted: 2024-01-26 | Resolved: 2025-02-03

## 2025-02-03 PROBLEM — R22.1 NECK MASS: Status: ACTIVE | Noted: 2025-02-03

## 2025-02-03 PROBLEM — R49.8 OTHER VOICE DISTURBANCE: Status: ACTIVE | Noted: 2025-02-03

## 2025-02-03 PROBLEM — Z87.09 HISTORY OF PHARYNGITIS: Status: RESOLVED | Noted: 2025-01-07 | Resolved: 2025-02-03

## 2025-02-11 ENCOUNTER — APPOINTMENT (OUTPATIENT)
Dept: ENDOCRINOLOGY | Facility: CLINIC | Age: 25
End: 2025-02-11

## 2025-02-20 ENCOUNTER — APPOINTMENT (OUTPATIENT)
Dept: ENDOCRINOLOGY | Facility: CLINIC | Age: 25
End: 2025-02-20

## 2025-04-09 ENCOUNTER — APPOINTMENT (OUTPATIENT)
Dept: NEPHROLOGY | Facility: CLINIC | Age: 25
End: 2025-04-09
Payer: MEDICAID

## 2025-04-09 VITALS — DIASTOLIC BLOOD PRESSURE: 51 MMHG | HEART RATE: 81 BPM | SYSTOLIC BLOOD PRESSURE: 90 MMHG

## 2025-04-09 VITALS — DIASTOLIC BLOOD PRESSURE: 66 MMHG | SYSTOLIC BLOOD PRESSURE: 107 MMHG

## 2025-04-09 VITALS — HEART RATE: 110 BPM

## 2025-04-09 VITALS — WEIGHT: 125 LBS

## 2025-04-09 DIAGNOSIS — N34.2 OTHER URETHRITIS: ICD-10-CM

## 2025-04-09 DIAGNOSIS — G90.A POSTURAL ORTHOSTATIC TACHYCARDIA SYNDROME [POTS]: ICD-10-CM

## 2025-04-09 DIAGNOSIS — R39.9 UNSPECIFIED SYMPTOMS AND SIGNS INVOLVING THE GENITOURINARY SYSTEM: ICD-10-CM

## 2025-04-09 PROCEDURE — 99214 OFFICE O/P EST MOD 30 MIN: CPT

## 2025-04-09 RX ORDER — DOXYCYCLINE HYCLATE 100 MG/1
100 TABLET ORAL
Qty: 14 | Refills: 0 | Status: ACTIVE | COMMUNITY
Start: 2025-04-09 | End: 1900-01-01